# Patient Record
Sex: FEMALE | Race: BLACK OR AFRICAN AMERICAN | NOT HISPANIC OR LATINO | URBAN - METROPOLITAN AREA
[De-identification: names, ages, dates, MRNs, and addresses within clinical notes are randomized per-mention and may not be internally consistent; named-entity substitution may affect disease eponyms.]

---

## 2019-02-23 ENCOUNTER — EMERGENCY (EMERGENCY)
Facility: HOSPITAL | Age: 44
LOS: 1 days | Discharge: HOME | End: 2019-02-23
Attending: EMERGENCY MEDICINE
Payer: COMMERCIAL

## 2019-02-23 VITALS
OXYGEN SATURATION: 100 % | SYSTOLIC BLOOD PRESSURE: 135 MMHG | HEIGHT: 66 IN | TEMPERATURE: 98 F | HEART RATE: 60 BPM | RESPIRATION RATE: 18 BRPM | WEIGHT: 179.02 LBS | DIASTOLIC BLOOD PRESSURE: 95 MMHG

## 2019-02-23 PROCEDURE — 99284 EMERGENCY DEPT VISIT MOD MDM: CPT

## 2019-02-23 PROCEDURE — 99283 EMERGENCY DEPT VISIT LOW MDM: CPT

## 2019-02-23 RX ORDER — OXYCODONE HYDROCHLORIDE 5 MG/1
1 TABLET ORAL
Qty: 12 | Refills: 0 | OUTPATIENT
Start: 2019-02-23 | End: 2019-02-25

## 2019-02-23 RX ORDER — CHLORHEXIDINE GLUCONATE 213 G/1000ML
2 SOLUTION TOPICAL
Qty: 210 | Refills: 0 | OUTPATIENT
Start: 2019-02-23 | End: 2019-03-01

## 2019-02-23 RX ORDER — OXYCODONE HYDROCHLORIDE 5 MG/1
5 TABLET ORAL ONCE
Qty: 0 | Refills: 0 | Status: DISCONTINUED | OUTPATIENT
Start: 2019-02-23 | End: 2019-02-23

## 2019-02-23 RX ORDER — AMOXICILLIN 250 MG/5ML
500 SUSPENSION, RECONSTITUTED, ORAL (ML) ORAL ONCE
Qty: 0 | Refills: 0 | Status: COMPLETED | OUTPATIENT
Start: 2019-02-23 | End: 2019-02-23

## 2019-02-23 RX ORDER — AMOXICILLIN 250 MG/5ML
1 SUSPENSION, RECONSTITUTED, ORAL (ML) ORAL
Qty: 21 | Refills: 0 | OUTPATIENT
Start: 2019-02-23 | End: 2019-03-01

## 2019-02-23 RX ADMIN — OXYCODONE HYDROCHLORIDE 5 MILLIGRAM(S): 5 TABLET ORAL at 19:09

## 2019-02-23 RX ADMIN — Medication 500 MILLIGRAM(S): at 19:08

## 2019-02-23 NOTE — ED PROVIDER NOTE - CLINICAL SUMMARY MEDICAL DECISION MAKING FREE TEXT BOX
43F with dentalgia, will send home with Amoxicillin, Peridex and Oxycodone for pain control.  Will follow up with her dentist or with [facility] oral surgery clinic.

## 2019-02-23 NOTE — ED PROVIDER NOTE - OBJECTIVE STATEMENT
44 yo f no pmh pw r sided dental pain. states pain is at upper/lower r sided molars, sharp, intermittent, minimally relieved with ibuprofen, worse with pressure. pt states she has had ongoing sx for weeks. pt states she was scheduled for root canal at a month prior, however due to insurance issues she rescheduled it. pt has f/u appt this Thursday. presented today because she took ibuprofen at 15:00 with minimal relief. denies cp, sob, n/v, f/c.

## 2019-02-23 NOTE — ED PROVIDER NOTE - ATTENDING CONTRIBUTION TO CARE
Attending MD Aaron: I personally have seen and examined this patient.  Resident note reviewed and agree on plan of care and except where noted.  See below for details.     Seen in FT3, accompanied by     43F with no reported PMH/PSH/Meds/Allergies presents to the ED with R sided dental pain.  Reports pain has been going on for about 4-5 months.  Reports pain worsened about a week ago.  Reports had seen a dentist about two weeks ago and had a scan, was told she needed a coronectomy, but had insurance problems so did not return.  Now presents with increasing pain.  Denies fevers, chills.  Denies difficulty swallowing, denies difficulty breathing.  Denies tobacco, drugs, EtOH.  LMP 2/1/19.  Reports has appointment with a dentist on Thursday 2/28/19.  Reports minimal improvement with Ibuprofen.  On exam, NAD, unlabored breathing, lungs CTAB, no stridor, +S1S2, no m/r/g, no fluctuance along gum line, #32 with tooth decay, tenderness with tapping on tooth,  no fluctuance at buccal or lingual aspect of gum at that tooth, no tongue swelling, no induration of base of mouth, no fullness of neck or face, no drooling, no muffled voice; A/P: 43F with dentalgia and known need for coronectomy, declined block, will give po pain meds, will discharge with antibiotics, Amoxicillin 500mg TID x 7days, Peridex 30cc BID x 7 days, and Oxycodone for pain control.  Emphasized importance of dental follow up.  Will encourage follow up with own dentist but if unable to see her own dentist, will give [facility] clinic number [phone] (Mon-Fri) for follow up.  Stable for discharge. Follow up instructions given, importance of follow up emphasized, return to ED parameters reviewed and patient verbalized understanding.  All questions answered, all concerns addressed.

## 2019-02-23 NOTE — ED PROVIDER NOTE - PHYSICAL EXAMINATION
General: well appearing, interactive, well nourished, no apparent distress, speaking full sentences, no drooling, voice is not muffled  HEENT: EOMI, PERRLA, normal mucosa, normal oropharynx, no lesions on the lips or on oral mucosa, normal external ear, tooth 32 ttp palpation, no evidence of exudate  Neck: supple, no lymphadenopathy, full range of motion, no nuchal rigidity  CV: RRR  Resp: non-labored breathing  Abd: non-distended, soft  : no CVA tenderness  MSK: full range of motion, no cyanosis, no edema, no clubbing, no immobility  Neuro: muscle strength 5/5 in all extremities, normal gait  Skin: no rashes, skin intact General: well appearing, interactive, well nourished, no apparent distress, speaking full sentences, no drooling, voice is not muffled, no trismus  HEENT: EOMI, PERRLA, normal mucosa, normal oropharynx, no lesions on the lips or on oral mucosa, normal external ear, tooth 32 ttp palpation, no evidence of exudate  Neck: supple, no lymphadenopathy, full range of motion, no nuchal rigidity  CV: RRR  Resp: non-labored breathing  Abd: non-distended, soft  : no CVA tenderness  MSK: full range of motion, no cyanosis, no edema, no clubbing, no immobility  Neuro: muscle strength 5/5 in all extremities, normal gait  Skin: no rashes, skin intact

## 2019-02-23 NOTE — ED PROVIDER NOTE - NS ED ROS FT
GENERAL: No fever or chills, //             EYES: no change in vision, //             HEENT: dental pain, //             CARDIAC: no chest pain, //              PULMONARY: no cough or SOB, //             GI: no abdominal pain, no nausea or no vomiting, no diarrhea or constipation, //             : No changes in urination,  //            SKIN: no rashes,  //            NEURO: no headache,  //             MSK: No joint pain otherwise as HPI or negative. ~Alexy Redman DO PGY1

## 2019-02-23 NOTE — ED PROVIDER NOTE - NSFOLLOWUPINSTRUCTIONS_ED_ALL_ED_FT
1) Please follow up with your Primary Care Provider in 24-48 hours  2) Seek immediate medical care for any new or returning symptoms including but not limited to severe pain, difficulty breathing, high fevers  3) Take Tylenol 650 mg every 4-6 hours as needed for pain. Do not take more than 2 grams within a 24 hour period  4) Take Amoxicillin three times a day for 7 days  5) Use Peridex 30 mL twice a day for 7 days  6) Take Oxycodone up to every 6 hours as needed for severe pain. Do not operate heavy machinery or make critical decisions when using this medication  7) Follow up with Dental Clinic, their number is: [phone] (Mon-Fri)

## 2019-04-04 ENCOUNTER — OUTPATIENT (OUTPATIENT)
Dept: OUTPATIENT SERVICES | Facility: HOSPITAL | Age: 44
LOS: 1 days | End: 2019-04-04
Payer: COMMERCIAL

## 2019-04-04 VITALS
HEART RATE: 86 BPM | DIASTOLIC BLOOD PRESSURE: 89 MMHG | WEIGHT: 173.94 LBS | OXYGEN SATURATION: 100 % | HEIGHT: 66 IN | RESPIRATION RATE: 17 BRPM | TEMPERATURE: 98 F | SYSTOLIC BLOOD PRESSURE: 136 MMHG

## 2019-04-04 DIAGNOSIS — N84.0 POLYP OF CORPUS UTERI: ICD-10-CM

## 2019-04-04 DIAGNOSIS — Z01.818 ENCOUNTER FOR OTHER PREPROCEDURAL EXAMINATION: ICD-10-CM

## 2019-04-04 DIAGNOSIS — Z98.890 OTHER SPECIFIED POSTPROCEDURAL STATES: Chronic | ICD-10-CM

## 2019-04-04 LAB
ANION GAP SERPL CALC-SCNC: 6 MMOL/L — SIGNIFICANT CHANGE UP (ref 5–17)
APTT BLD: 40.1 SEC — HIGH (ref 27.5–36.3)
BUN SERPL-MCNC: 18 MG/DL — SIGNIFICANT CHANGE UP (ref 7–18)
CALCIUM SERPL-MCNC: 9.3 MG/DL — SIGNIFICANT CHANGE UP (ref 8.4–10.5)
CHLORIDE SERPL-SCNC: 107 MMOL/L — SIGNIFICANT CHANGE UP (ref 96–108)
CO2 SERPL-SCNC: 29 MMOL/L — SIGNIFICANT CHANGE UP (ref 22–31)
CREAT SERPL-MCNC: 1 MG/DL — SIGNIFICANT CHANGE UP (ref 0.5–1.3)
GLUCOSE SERPL-MCNC: 120 MG/DL — HIGH (ref 70–99)
HCG SERPL-ACNC: <1 MIU/ML — SIGNIFICANT CHANGE UP
HCT VFR BLD CALC: 37.7 % — SIGNIFICANT CHANGE UP (ref 34.5–45)
HGB BLD-MCNC: 11.9 G/DL — SIGNIFICANT CHANGE UP (ref 11.5–15.5)
INR BLD: 1.01 RATIO — SIGNIFICANT CHANGE UP (ref 0.88–1.16)
MCHC RBC-ENTMCNC: 29.8 PG — SIGNIFICANT CHANGE UP (ref 27–34)
MCHC RBC-ENTMCNC: 31.6 GM/DL — LOW (ref 32–36)
MCV RBC AUTO: 94.3 FL — SIGNIFICANT CHANGE UP (ref 80–100)
NRBC # BLD: 0 /100 WBCS — SIGNIFICANT CHANGE UP (ref 0–0)
PLATELET # BLD AUTO: 326 K/UL — SIGNIFICANT CHANGE UP (ref 150–400)
POTASSIUM SERPL-MCNC: 3.8 MMOL/L — SIGNIFICANT CHANGE UP (ref 3.5–5.3)
POTASSIUM SERPL-SCNC: 3.8 MMOL/L — SIGNIFICANT CHANGE UP (ref 3.5–5.3)
PROTHROM AB SERPL-ACNC: 11.2 SEC — SIGNIFICANT CHANGE UP (ref 10–12.9)
RBC # BLD: 4 M/UL — SIGNIFICANT CHANGE UP (ref 3.8–5.2)
RBC # FLD: 12.5 % — SIGNIFICANT CHANGE UP (ref 10.3–14.5)
SODIUM SERPL-SCNC: 142 MMOL/L — SIGNIFICANT CHANGE UP (ref 135–145)
WBC # BLD: 7.71 K/UL — SIGNIFICANT CHANGE UP (ref 3.8–10.5)
WBC # FLD AUTO: 7.71 K/UL — SIGNIFICANT CHANGE UP (ref 3.8–10.5)

## 2019-04-04 PROCEDURE — 85730 THROMBOPLASTIN TIME PARTIAL: CPT

## 2019-04-04 PROCEDURE — 36415 COLL VENOUS BLD VENIPUNCTURE: CPT

## 2019-04-04 PROCEDURE — 86900 BLOOD TYPING SEROLOGIC ABO: CPT

## 2019-04-04 PROCEDURE — 86850 RBC ANTIBODY SCREEN: CPT

## 2019-04-04 PROCEDURE — 85027 COMPLETE CBC AUTOMATED: CPT

## 2019-04-04 PROCEDURE — 80048 BASIC METABOLIC PNL TOTAL CA: CPT

## 2019-04-04 PROCEDURE — 85610 PROTHROMBIN TIME: CPT

## 2019-04-04 PROCEDURE — 86901 BLOOD TYPING SEROLOGIC RH(D): CPT

## 2019-04-04 PROCEDURE — 84702 CHORIONIC GONADOTROPIN TEST: CPT

## 2019-04-04 PROCEDURE — G0463: CPT

## 2019-04-04 RX ORDER — SODIUM CHLORIDE 9 MG/ML
3 INJECTION INTRAMUSCULAR; INTRAVENOUS; SUBCUTANEOUS EVERY 8 HOURS
Qty: 0 | Refills: 0 | Status: DISCONTINUED | OUTPATIENT
Start: 2019-04-12 | End: 2019-04-20

## 2019-04-04 NOTE — H&P PST ADULT - HISTORY OF PRESENT ILLNESS
43 year old female in generally good health presents with spotting in between my periods. Pt has a 28 day cycle lasting 5 days. Pt states started spotting a few months ago. Pt was seen by gyn and had a sonogram performed. A biopsy was taken but now requires more intervention. Pt is schedule for a Dilation and Curettage and hysteroscopy on 4/12/2019.

## 2019-04-04 NOTE — H&P PST ADULT - NSICDXPROBLEM_GEN_ALL_CORE_FT
R/O PROBLEM DIAGNOSES  Problem: Polyp of corpus uteri  Assessment and Plan: Schedule for dilation and curettage hysteroscopy

## 2019-04-04 NOTE — H&P PST ADULT - NSANTHOSAYNRD_GEN_A_CORE
No. TD screening performed.  STOP BANG Legend: 0-2 = LOW Risk; 3-4 = INTERMEDIATE Risk; 5-8 = HIGH Risk

## 2019-04-11 ENCOUNTER — TRANSCRIPTION ENCOUNTER (OUTPATIENT)
Age: 44
End: 2019-04-11

## 2019-04-12 ENCOUNTER — OUTPATIENT (OUTPATIENT)
Dept: OUTPATIENT SERVICES | Facility: HOSPITAL | Age: 44
LOS: 1 days | End: 2019-04-12
Payer: COMMERCIAL

## 2019-04-12 VITALS
RESPIRATION RATE: 17 BRPM | HEIGHT: 66 IN | WEIGHT: 173.94 LBS | DIASTOLIC BLOOD PRESSURE: 81 MMHG | TEMPERATURE: 98 F | HEART RATE: 106 BPM | SYSTOLIC BLOOD PRESSURE: 123 MMHG | OXYGEN SATURATION: 95 %

## 2019-04-12 VITALS
TEMPERATURE: 98 F | DIASTOLIC BLOOD PRESSURE: 67 MMHG | SYSTOLIC BLOOD PRESSURE: 123 MMHG | OXYGEN SATURATION: 100 % | HEART RATE: 76 BPM | RESPIRATION RATE: 16 BRPM

## 2019-04-12 DIAGNOSIS — Z01.818 ENCOUNTER FOR OTHER PREPROCEDURAL EXAMINATION: ICD-10-CM

## 2019-04-12 DIAGNOSIS — N84.0 POLYP OF CORPUS UTERI: ICD-10-CM

## 2019-04-12 DIAGNOSIS — Z98.890 OTHER SPECIFIED POSTPROCEDURAL STATES: Chronic | ICD-10-CM

## 2019-04-12 LAB
APTT BLD: 38 SEC — HIGH (ref 27.5–36.3)
HCG UR QL: NEGATIVE — SIGNIFICANT CHANGE UP
INR BLD: 0.97 RATIO — SIGNIFICANT CHANGE UP (ref 0.88–1.16)
PROTHROM AB SERPL-ACNC: 10.7 SEC — SIGNIFICANT CHANGE UP (ref 10–12.9)

## 2019-04-12 RX ORDER — SODIUM CHLORIDE 9 MG/ML
1000 INJECTION, SOLUTION INTRAVENOUS
Qty: 0 | Refills: 0 | Status: DISCONTINUED | OUTPATIENT
Start: 2019-04-12 | End: 2019-04-12

## 2019-04-12 RX ORDER — ONDANSETRON 8 MG/1
4 TABLET, FILM COATED ORAL EVERY 6 HOURS
Qty: 0 | Refills: 0 | Status: DISCONTINUED | OUTPATIENT
Start: 2019-04-12 | End: 2019-04-20

## 2019-04-12 RX ORDER — OXYCODONE AND ACETAMINOPHEN 5; 325 MG/1; MG/1
2 TABLET ORAL EVERY 6 HOURS
Qty: 0 | Refills: 0 | Status: DISCONTINUED | OUTPATIENT
Start: 2019-04-12 | End: 2019-04-12

## 2019-04-12 RX ORDER — IBUPROFEN 200 MG
600 TABLET ORAL EVERY 8 HOURS
Qty: 0 | Refills: 0 | Status: DISCONTINUED | OUTPATIENT
Start: 2019-04-12 | End: 2019-04-20

## 2019-04-12 NOTE — ASU DISCHARGE PLAN (ADULT/PEDIATRIC) - CALL YOUR DOCTOR IF YOU HAVE ANY OF THE FOLLOWING:
Inability to tolerate liquids or foods/Fever greater than (need to indicate Fahrenheit or Celsius)/Bleeding that does not stop/Nausea and vomiting that does not stop/Unable to urinate

## 2019-04-12 NOTE — ASU DISCHARGE PLAN (ADULT/PEDIATRIC) - ACTIVITY LEVEL
Nothing per vagina/No douching/No heavy lifting/No tampons/No tub baths/No intercourse No tampons/Nothing per rectum/No tub baths/No intercourse/No heavy lifting/No douching/Nothing per vagina

## 2019-04-12 NOTE — ASU DISCHARGE PLAN (ADULT/PEDIATRIC) - CARE PROVIDER_API CALL
Lelo Wilkins)  Obstetrics and Gynecology  [address] [place] [address]  Phone: [phone]  Fax: [phone]  Follow Up Time:

## 2019-04-15 ENCOUNTER — RESULT REVIEW (OUTPATIENT)
Age: 44
End: 2019-04-15

## 2019-04-15 PROCEDURE — 86901 BLOOD TYPING SEROLOGIC RH(D): CPT

## 2019-04-15 PROCEDURE — 81025 URINE PREGNANCY TEST: CPT

## 2019-04-15 PROCEDURE — 88305 TISSUE EXAM BY PATHOLOGIST: CPT | Mod: 26

## 2019-04-15 PROCEDURE — 36415 COLL VENOUS BLD VENIPUNCTURE: CPT

## 2019-04-15 PROCEDURE — 86900 BLOOD TYPING SEROLOGIC ABO: CPT

## 2019-04-15 PROCEDURE — 85730 THROMBOPLASTIN TIME PARTIAL: CPT

## 2019-04-15 PROCEDURE — 88305 TISSUE EXAM BY PATHOLOGIST: CPT

## 2019-04-15 PROCEDURE — 58558 HYSTEROSCOPY BIOPSY: CPT

## 2019-04-15 PROCEDURE — 86850 RBC ANTIBODY SCREEN: CPT

## 2019-04-15 PROCEDURE — 85610 PROTHROMBIN TIME: CPT

## 2019-04-16 LAB — SURGICAL PATHOLOGY STUDY: SIGNIFICANT CHANGE UP

## 2019-10-09 NOTE — H&P PST ADULT - RESPIRATORY AND THORAX
Daily Note     Today's date: 10/9/2019  Patient name: Mariam Aviles  : 1979  MRN: [id number]  Referring provider: Jose Jones MD  Dx:   Encounter Diagnosis     ICD-10-CM    1  Acute pain of left shoulder M25 512    2  Acromioclavicular joint injury, left, subsequent encounter S49  92XD                   Subjective: "I am still getting that clicking/popping in my shoulder during certain movements " "Sometimes it is painful, other times it is not "      Objective: See treatment diary below      Assessment: Tolerated treatment well  No progressions secondary to multiple progressions last visit  PROM of the L shoulder WNL  Patient demonstrated fatigue post treatment and would benefit from continued PT      Plan: Continue per plan of care        Precautions: none  Re-evaluation due 10/18  Manual  10/2 10/4 10/7 10/9 9/30   SC jt mobs AP and inferior        L shoulder PROM to jayda x x x x x                               Exercise Diary  10/2 10/4 10/7 10/7 9/30   HEP instruction  Updated      Pulleys 10" x10 Flex/Scap 10" x10 Flex/Scap 10" x10 Flex/Scap 10" x10 Flex/Scap 10" x10 Flex/Scap   Finger ladder 10" x10 Flex/Scap 10" x10 Flex/Scap 10" x10 Flex/Scap 10"x10  Flex/scap 10" x10 Flex/Scap   UT stretch DC DC to HEP --  L side only 30" x3   Levator stretch DC DC to HEP --  L side only 30" x3   Shoulder isometrics - all dir 5" x10 Flex/ER/IR Red TB IR/ER x20 ea Red TB IR/ER x20 ea Red TB IR/ER x20 ea 5" x10 Flex/ER/IR   Standing AAROM shoulder scaption w/cane        Standing scaption w/therapist  assist scap upward rotation        Pec stretch 30" x3  ea DC to HEP -- -- 30" x3  ea   Rhomboid stretch 30" x3  ea DC to HEP -- -- 30" x3  ea   UBE  100 RPM 8' retro 100 RPM 10' retro 100 RPM 10' retro  100 RPM 10' retro  100 RPM 8' retro    MTP/LTP Green TB 5" 2x10 Green TB 5" 2x10 Blue TB 5" 2x10 Blue TB 5" 2x10 Green TB 5" 2x10   AROM Flex to 90* 3" 2x10 3" 2x10 3" 2x10 1# 3" 2x10 1#    AROM Scap to 90* 3" 2x10 3" 2x10 3" 2x10 1# 3" 2x10 1#    Prone Flex 2x10 2x10 2x10 1# 2x10 1#    Prone HRZ ABD 2x10 2x10 2x10 1# 2x10 1#    Prone Ext 2x10 2x10 2x10 1# 2x10 1#                                Modalities  10/2 10/4 10/7 10/9 9/30   Stim/CP post tx to L shoulder NP NP --  NP details…

## 2020-05-30 NOTE — ED PROVIDER NOTE - TOBACCO USE
Patient:   EFREN KHANNA            MRN: [id number]            FIN: [id number]              Age:   31 years     Sex:  FEMALE     :  [birth year]   Associated Diagnoses:   None   Author:   FILIBERTO BOYD     Postoperative Information     Postoperative Information   Post Operative Note:  Post Anesthesia Care Unit.    Postoperative Information:          Preoperative Diagnosis:     Bartholin's gland cyst.         Postoperative Diagnosis: Same As Pre-Op Dx,     .         Performed by:     Asem, MD Humera.         Assistant:     Filiberto Lew MD .         Surgical Tasks Performed by Assistant: Dissecting tissue, Altering tissue.         Procedures Performed:     incision and drainage of Bartholin gland cyst.         Findings: 3x3cm mobile, well-demarcated Bartholin gland cyst, mildly tender with no induration or erythema; mucinous fluid upon incision.         Specimens Removed: None.         Estimated Blood Loss: 1  ml, Blood Administered: None,     .         Blood Administered: No.         Complications: None.         Grafts/Implants: None.    Anesthetic utilized:  local .  .    Consents:  Patient was counseled on risks of incision and drainage which included infection and bleeding. Patient agreed to procedure and signed a consent for Incision and Drainage.  Procedure:   Area was identified and wiped clean with alcohol swab. 5mL of 1% lidocaine was injected superficially into the skin overlying the Bartholin's gland. 3mm was made with scalpel and mucinous fluid was drained from the abscess. Iodiform packing was placed into the emptied gland and trimmed with 1cm tail. Hemostasis was noted and the patient tolerated the exam well.  Dr. Humera Asem was present for the entirety of the procedure.   Filiberto Lew MD, PGY2  Obstetrics and Gynecology  I was scrubbed and present throughout the procedure and agree with the note as documented above. Humera Asem, MD  
Never smoker

## 2022-01-04 NOTE — ASU PREOP CHECKLIST - ISOLATION PRECAUTIONS
Detail Level: Detailed Add 47730 Cpt? (Important Note: In 2017 The Use Of 31776 Is Being Tracked By Cms To Determine Future Global Period Reimbursement For Global Periods): no none

## 2022-05-24 ENCOUNTER — APPOINTMENT (OUTPATIENT)
Dept: ORTHOPEDIC SURGERY | Facility: CLINIC | Age: 47
End: 2022-05-24
Payer: COMMERCIAL

## 2022-05-24 VITALS — HEIGHT: 66 IN | BODY MASS INDEX: 26.03 KG/M2 | WEIGHT: 162 LBS

## 2022-05-24 DIAGNOSIS — E11.9 TYPE 2 DIABETES MELLITUS W/OUT COMPLICATIONS: ICD-10-CM

## 2022-05-24 PROBLEM — Z00.00 ENCOUNTER FOR PREVENTIVE HEALTH EXAMINATION: Status: ACTIVE | Noted: 2022-05-24

## 2022-05-24 PROCEDURE — 99203 OFFICE O/P NEW LOW 30 MIN: CPT | Mod: 25

## 2022-05-24 PROCEDURE — 72100 X-RAY EXAM L-S SPINE 2/3 VWS: CPT

## 2022-05-24 RX ORDER — IBUPROFEN 800 MG/1
800 TABLET, FILM COATED ORAL 3 TIMES DAILY
Qty: 90 | Refills: 3 | Status: ACTIVE | COMMUNITY
Start: 2022-05-24 | End: 1900-01-01

## 2022-05-24 RX ORDER — METFORMIN HYDROCHLORIDE 500 MG/1
500 TABLET, COATED ORAL
Refills: 0 | Status: ACTIVE | COMMUNITY

## 2022-05-24 NOTE — PHYSICAL EXAM
[Straightening consistent with spasm] : Straightening consistent with spasm [There are no fractures, subluxations or dislocations. No significant abnormalities are seen] : There are no fractures, subluxations or dislocations. No significant abnormalities are seen [] : no erythema

## 2022-05-24 NOTE — HISTORY OF PRESENT ILLNESS
[10] : 10 [Dull/Aching] : dull/aching [Sharp] : sharp [Constant] : constant [de-identified] : 5/24/22: Patient is a 47 yo female c/o right leg pain for 3 weeks with no specific injury. SHe is having low back pain. No n/t. Taking tylenol as needed for pain. Pain is worse with sitting and laying down. No previous injuries or surgeries to low back or right hip. [] : no [FreeTextEntry5] : 05/06/2022 pt hurt the right leg trying to stop someone from falling [FreeTextEntry7] : down the right leg [de-identified] : 05/14/2022 [de-identified] : city[place]

## 2022-05-24 NOTE — PROCEDURE
[Therapeutic Injection] : therapeutic injection [Right] : of the right [Other: ____] : [unfilled] [___ cc    1%] : Lidocaine ~Vcc of 1%  [___ cc    30mg] : Ketorolac (Toradol) ~Vcc of 30 mg

## 2022-05-24 NOTE — ASSESSMENT
[FreeTextEntry1] : Xrays reviewed with patient\par Treatment options discussed\par TOradol injection done for pain relief, tolerated well\par ibuprofen 800 sent for pain and inflammation, start tomorrow\par flexeril sent for spasm\par Recommend course of PT/HEP\par Follow up in 4 weeks with spine specialist

## 2022-05-25 PROBLEM — N84.0 POLYP OF CORPUS UTERI: Chronic | Status: ACTIVE | Noted: 2019-04-04

## 2022-05-31 ENCOUNTER — APPOINTMENT (OUTPATIENT)
Dept: ORTHOPEDIC SURGERY | Facility: CLINIC | Age: 47
End: 2022-05-31

## 2022-05-31 ENCOUNTER — APPOINTMENT (OUTPATIENT)
Dept: ORTHOPEDIC SURGERY | Facility: CLINIC | Age: 47
End: 2022-05-31
Payer: COMMERCIAL

## 2022-05-31 VITALS — WEIGHT: 162 LBS | BODY MASS INDEX: 26.03 KG/M2 | HEIGHT: 66 IN

## 2022-05-31 DIAGNOSIS — M76.61 ACHILLES TENDINITIS, RIGHT LEG: ICD-10-CM

## 2022-05-31 PROCEDURE — 99213 OFFICE O/P EST LOW 20 MIN: CPT

## 2022-05-31 NOTE — PHYSICAL EXAM
[Straightening consistent with spasm] : Straightening consistent with spasm [There are no fractures, subluxations or dislocations. No significant abnormalities are seen] : There are no fractures, subluxations or dislocations. No significant abnormalities are seen [Right] : right foot and ankle [] : achilles tendon insertion tenderness

## 2022-05-31 NOTE — HISTORY OF PRESENT ILLNESS
[de-identified] : 5/31/22: Here to f/u. States her pain continues. Has not had PT yet. Also having heel pain.\par \par 5/24/22: Patient is a 45 yo female c/o right leg pain for 3 weeks with no specific injury. SHe is having low back pain. No n/t. Taking tylenol as needed for pain. Pain is worse with sitting and laying down. No previous injuries or surgeries to low back or right hip. 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

## 2022-05-31 NOTE — ASSESSMENT
[FreeTextEntry1] : Treatment options discussed with patient and  in detail\par continue ibuprofen 800and flexeril\par Recommend again starting course of PT/HEP\par OOW for 1 week renewed, she still has not made specialist appointment. Discussed she must see specialist to be evaluated, OOW will not be renewed after today in UC.\par Follow up in 1 weeks with spine specialist \par Ankle specialist follow up if achilles pain continues

## 2022-05-31 NOTE — HISTORY OF PRESENT ILLNESS
[de-identified] : 5/31/22: Here to f/u. States her pain continues. Has not had PT yet. Also having heel pain.\par \par 5/24/22: Patient is a 47 yo female c/o right leg pain for 3 weeks with no specific injury. SHe is having low back pain. No n/t. Taking tylenol as needed for pain. Pain is worse with sitting and laying down. No previous injuries or surgeries to low back or right hip. 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

## 2022-06-06 ENCOUNTER — APPOINTMENT (OUTPATIENT)
Dept: ORTHOPEDIC SURGERY | Facility: CLINIC | Age: 47
End: 2022-06-06
Payer: COMMERCIAL

## 2022-06-06 PROCEDURE — 99214 OFFICE O/P EST MOD 30 MIN: CPT

## 2022-06-06 NOTE — ASSESSMENT
[FreeTextEntry1] : Pt. to see Dr. Tapia tomorrow.\par No foot/ankle pathology at this time.\par Her symptoms are stemming from her lumbar spine.

## 2022-06-06 NOTE — HISTORY OF PRESENT ILLNESS
[Work related] : work related [Sudden] : sudden [6] : 6 [5] : 5 [Dull/Aching] : dull/aching [Household chores] : household chores [Leisure] : leisure [Social interactions] : social interactions [Rest] : rest [Meds] : meds [Ice] : ice [Sitting] : sitting [Standing] : standing [Walking] : walking [Stairs] : stairs [Not working due to injury] : Work status: not working due to injury [de-identified] : Pt is a 46 year old female who presents for evaluation of her RT ankle. No trauma reported. Pt. was seen 5/24 and 5/31/2022 by [facility]: WILVER Gordon initially with c/o right leg pain since early May 2022. She is scheduled to see spine specialist, Dr. Tapia tomorrow. She experiences LBP and pain that radiates into her RT lower extremity. There is numbness/tingling in the RT leg. NSAIDS and muscle relaxer prn. Ambulating with a cane. No previous injury/problem with RT ankle.  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

## 2022-06-06 NOTE — RETURN TO WORK/SCHOOL
[FreeTextEntry1] : Pt. is unable to work at this time due the nature of their injury. This will be reevaluated by Dr. Tapia on 6/7/22. \par

## 2022-06-06 NOTE — PHYSICAL EXAM
[Right] : right foot and ankle [NL (40)] : plantar flexion 40 degrees [NL 30)] : inversion 30 degrees [NL (20)] : eversion 20 degrees [2+] : posterior tibialis pulse: 2+ [Normal] : saphenous nerve sensation normal [5___] : right extensor hallicus longus 5[unfilled]/5 [4___] : inversion 4[unfilled]/5 [] : no pain when stressing lateral tarsal metatarsal joint

## 2022-06-07 ENCOUNTER — APPOINTMENT (OUTPATIENT)
Dept: ORTHOPEDIC SURGERY | Facility: CLINIC | Age: 47
End: 2022-06-07
Payer: COMMERCIAL

## 2022-06-07 PROCEDURE — 99072 ADDL SUPL MATRL&STAF TM PHE: CPT

## 2022-06-07 PROCEDURE — 99204 OFFICE O/P NEW MOD 45 MIN: CPT

## 2022-06-07 NOTE — RETURN TO WORK/SCHOOL
[Return Date: _____] : as of [unfilled].  This has been discussed in detail with ~Yen~ and ~he/she~ understands this. [Work] : work

## 2022-06-14 ENCOUNTER — APPOINTMENT (OUTPATIENT)
Dept: MRI IMAGING | Facility: CLINIC | Age: 47
End: 2022-06-14

## 2022-06-14 ENCOUNTER — FORM ENCOUNTER (OUTPATIENT)
Age: 47
End: 2022-06-14

## 2022-06-15 ENCOUNTER — APPOINTMENT (OUTPATIENT)
Dept: MRI IMAGING | Facility: CLINIC | Age: 47
End: 2022-06-15
Payer: COMMERCIAL

## 2022-06-15 PROCEDURE — 72148 MRI LUMBAR SPINE W/O DYE: CPT

## 2022-06-15 PROCEDURE — 99072 ADDL SUPL MATRL&STAF TM PHE: CPT

## 2022-06-17 ENCOUNTER — APPOINTMENT (OUTPATIENT)
Dept: ORTHOPEDIC SURGERY | Facility: CLINIC | Age: 47
End: 2022-06-17
Payer: COMMERCIAL

## 2022-06-17 VITALS — WEIGHT: 162 LBS | BODY MASS INDEX: 37.49 KG/M2 | HEIGHT: 55 IN

## 2022-06-17 PROCEDURE — 99072 ADDL SUPL MATRL&STAF TM PHE: CPT

## 2022-06-17 PROCEDURE — 99215 OFFICE O/P EST HI 40 MIN: CPT

## 2022-07-06 ENCOUNTER — TRANSCRIPTION ENCOUNTER (OUTPATIENT)
Age: 47
End: 2022-07-06

## 2022-07-07 ENCOUNTER — TRANSCRIPTION ENCOUNTER (OUTPATIENT)
Age: 47
End: 2022-07-07

## 2022-07-07 ENCOUNTER — INPATIENT (INPATIENT)
Facility: HOSPITAL | Age: 47
LOS: 4 days | Discharge: HOME | End: 2022-07-12
Attending: ORTHOPAEDIC SURGERY | Admitting: ORTHOPAEDIC SURGERY
Payer: COMMERCIAL

## 2022-07-07 ENCOUNTER — APPOINTMENT (OUTPATIENT)
Dept: ORTHOPEDIC SURGERY | Facility: CLINIC | Age: 47
End: 2022-07-07

## 2022-07-07 VITALS — WEIGHT: 162 LBS | HEIGHT: 66 IN | BODY MASS INDEX: 26.03 KG/M2

## 2022-07-07 VITALS
HEIGHT: 66 IN | TEMPERATURE: 98 F | RESPIRATION RATE: 16 BRPM | SYSTOLIC BLOOD PRESSURE: 131 MMHG | OXYGEN SATURATION: 98 % | HEART RATE: 88 BPM | WEIGHT: 162.04 LBS | DIASTOLIC BLOOD PRESSURE: 87 MMHG

## 2022-07-07 DIAGNOSIS — Z98.890 OTHER SPECIFIED POSTPROCEDURAL STATES: Chronic | ICD-10-CM

## 2022-07-07 LAB
BASOPHILS # BLD AUTO: 0.02 K/UL — SIGNIFICANT CHANGE UP (ref 0–0.2)
BASOPHILS NFR BLD AUTO: 0.2 % — SIGNIFICANT CHANGE UP (ref 0–2)
EOSINOPHIL # BLD AUTO: 0.03 K/UL — SIGNIFICANT CHANGE UP (ref 0–0.5)
EOSINOPHIL NFR BLD AUTO: 0.4 % — SIGNIFICANT CHANGE UP (ref 0–6)
HCT VFR BLD CALC: 37.5 % — SIGNIFICANT CHANGE UP (ref 34.5–45)
HGB BLD-MCNC: 12.4 G/DL — SIGNIFICANT CHANGE UP (ref 11.5–15.5)
IMM GRANULOCYTES NFR BLD AUTO: 0.2 % — SIGNIFICANT CHANGE UP (ref 0–1.5)
LYMPHOCYTES # BLD AUTO: 3.17 K/UL — SIGNIFICANT CHANGE UP (ref 1–3.3)
LYMPHOCYTES # BLD AUTO: 39.2 % — SIGNIFICANT CHANGE UP (ref 13–44)
MCHC RBC-ENTMCNC: 30 PG — SIGNIFICANT CHANGE UP (ref 27–34)
MCHC RBC-ENTMCNC: 33.1 G/DL — SIGNIFICANT CHANGE UP (ref 32–36)
MCV RBC AUTO: 90.8 FL — SIGNIFICANT CHANGE UP (ref 80–100)
MONOCYTES # BLD AUTO: 0.58 K/UL — SIGNIFICANT CHANGE UP (ref 0–0.9)
MONOCYTES NFR BLD AUTO: 7.2 % — SIGNIFICANT CHANGE UP (ref 2–14)
NEUTROPHILS # BLD AUTO: 4.26 K/UL — SIGNIFICANT CHANGE UP (ref 1.8–7.4)
NEUTROPHILS NFR BLD AUTO: 52.8 % — SIGNIFICANT CHANGE UP (ref 43–77)
NRBC # BLD: 0 /100 WBCS — SIGNIFICANT CHANGE UP (ref 0–0)
PLATELET # BLD AUTO: 293 K/UL — SIGNIFICANT CHANGE UP (ref 150–400)
RBC # BLD: 4.13 M/UL — SIGNIFICANT CHANGE UP (ref 3.8–5.2)
RBC # FLD: 13.2 % — SIGNIFICANT CHANGE UP (ref 10.3–14.5)
WBC # BLD: 8.08 K/UL — SIGNIFICANT CHANGE UP (ref 3.8–10.5)
WBC # FLD AUTO: 8.08 K/UL — SIGNIFICANT CHANGE UP (ref 3.8–10.5)

## 2022-07-07 PROCEDURE — 63048 LAM FACETEC &FORAMOT EA ADDL: CPT

## 2022-07-07 PROCEDURE — 99284 EMERGENCY DEPT VISIT MOD MDM: CPT

## 2022-07-07 PROCEDURE — 99215 OFFICE O/P EST HI 40 MIN: CPT | Mod: 57

## 2022-07-07 PROCEDURE — 63047 LAM FACETEC & FORAMOT LUMBAR: CPT

## 2022-07-07 PROCEDURE — 99072 ADDL SUPL MATRL&STAF TM PHE: CPT

## 2022-07-07 RX ORDER — SODIUM CHLORIDE 9 MG/ML
1000 INJECTION, SOLUTION INTRAVENOUS
Refills: 0 | Status: DISCONTINUED | OUTPATIENT
Start: 2022-07-07 | End: 2022-07-08

## 2022-07-07 RX ORDER — MAGNESIUM HYDROXIDE 400 MG/1
30 TABLET, CHEWABLE ORAL DAILY
Refills: 0 | Status: DISCONTINUED | OUTPATIENT
Start: 2022-07-07 | End: 2022-07-08

## 2022-07-07 RX ORDER — SENNA PLUS 8.6 MG/1
2 TABLET ORAL AT BEDTIME
Refills: 0 | Status: DISCONTINUED | OUTPATIENT
Start: 2022-07-07 | End: 2022-07-08

## 2022-07-07 RX ORDER — ONDANSETRON 8 MG/1
4 TABLET, FILM COATED ORAL EVERY 6 HOURS
Refills: 0 | Status: DISCONTINUED | OUTPATIENT
Start: 2022-07-07 | End: 2022-07-08

## 2022-07-07 RX ORDER — OXYCODONE HYDROCHLORIDE 5 MG/1
10 TABLET ORAL EVERY 4 HOURS
Refills: 0 | Status: DISCONTINUED | OUTPATIENT
Start: 2022-07-07 | End: 2022-07-08

## 2022-07-07 RX ORDER — ACETAMINOPHEN 500 MG
975 TABLET ORAL EVERY 8 HOURS
Refills: 0 | Status: DISCONTINUED | OUTPATIENT
Start: 2022-07-07 | End: 2022-07-08

## 2022-07-07 RX ORDER — ASCORBIC ACID 60 MG
500 TABLET,CHEWABLE ORAL
Refills: 0 | Status: DISCONTINUED | OUTPATIENT
Start: 2022-07-07 | End: 2022-07-08

## 2022-07-07 RX ORDER — POLYETHYLENE GLYCOL 3350 17 G/17G
17 POWDER, FOR SOLUTION ORAL AT BEDTIME
Refills: 0 | Status: DISCONTINUED | OUTPATIENT
Start: 2022-07-07 | End: 2022-07-08

## 2022-07-07 RX ORDER — OXYCODONE HYDROCHLORIDE 5 MG/1
5 TABLET ORAL EVERY 4 HOURS
Refills: 0 | Status: DISCONTINUED | OUTPATIENT
Start: 2022-07-07 | End: 2022-07-08

## 2022-07-07 NOTE — ED ADULT TRIAGE NOTE - AS HEIGHT TYPE
PHYSICIAN ORDERS      DATE & TIME ISSUED: 2018 2:02 PM  PATIENT NAME: Rashad Ortiz   : 1975     [facility] MR# [if applicable]: [id number]     DIAGNOSIS:  ***  ICD-10 CODE: ***     ***    Any questions please call: ***    { [facility] FAXES:[id number]}.    {[facility] TRANSPLANT MD SIGNATURE:[id number]}       stated

## 2022-07-07 NOTE — ED ADULT TRIAGE NOTE - HEIGHT IN FEET
Breathing spontaneous and unlabored. Breath sounds clear and equal bilaterally with regular rhythm. 5

## 2022-07-07 NOTE — ED PROVIDER NOTE - IV ALTEPLASE EXCL ABS HIDDEN
Suprapubic Cystostomy   WHAT YOU NEED TO KNOW:   What do I need to know about suprapubic cystostomy? Suprapubic cystostomy is surgery to create a stoma (opening) through your abdomen into your bladder  This opening is where a catheter is inserted to drain urine  You may need a cystostomy if your urine flow is blocked  How do I prepare for surgery? Your healthcare provider will talk to you about how to prepare for surgery  He or she may tell you not to eat or drink anything after midnight on the day of your surgery  He or she will tell you what medicines to take or not take on the day of your surgery  What will happen during surgery? Your surgeon will make a small incision in your abdomen below your belly button  Another small incision will be made in your bladder  The catheter will be inserted into your abdomen and bladder  Once the catheter is in place, the balloon on the end of the catheter will be filled with sterile water  This balloon keeps the catheter in place inside the bladder  The other end of the catheter will be connected to a clean drainage bag or closed with a valve  The catheter may be secured in the stoma with stitches or surgical tape  What are the risks of surgery? You may bleed more than expected or get an infection  Your organs or blood vessels may be damaged during surgery  Your bladder may become irritated  Long-term use of a catheter can lead to kidney stones, blood in your urine, or bladder inflammation  CARE AGREEMENT:   You have the right to help plan your care  Learn about your health condition and how it may be treated  Discuss treatment options with your healthcare providers to decide what care you want to receive  You always have the right to refuse treatment  The above information is an  only  It is not intended as medical advice for individual conditions or treatments   Talk to your doctor, nurse or pharmacist before following any medical regimen to see if it is safe and effective for you  © Copyright [address] Information is for End User's use only and may not be sold, redistributed or otherwise used for commercial purposes   All illustrations and images included in CareNotes® are the copyrighted property of A D A M , Inc  or [address] [place], [place] [address]abhishek rashmi  show

## 2022-07-07 NOTE — ED PROVIDER NOTE - OBJECTIVE STATEMENT
46 year old female came to the ED because of right lower back pain since an injury month ago, last week she had episode of urinary incontinence. She had outpatient MRI. No fever, no vomiting, no diarrhea, no chest pain, no sob.

## 2022-07-08 ENCOUNTER — TRANSCRIPTION ENCOUNTER (OUTPATIENT)
Age: 47
End: 2022-07-08

## 2022-07-08 ENCOUNTER — RESULT REVIEW (OUTPATIENT)
Age: 47
End: 2022-07-08

## 2022-07-08 LAB
A1C WITH ESTIMATED AVERAGE GLUCOSE RESULT: 12.1 % — HIGH (ref 4–5.6)
ALBUMIN SERPL ELPH-MCNC: 3.3 G/DL — SIGNIFICANT CHANGE UP (ref 3.3–5)
ALP SERPL-CCNC: 73 U/L — SIGNIFICANT CHANGE UP (ref 40–120)
ALT FLD-CCNC: 26 U/L — SIGNIFICANT CHANGE UP (ref 12–78)
ANION GAP SERPL CALC-SCNC: 6 MMOL/L — SIGNIFICANT CHANGE UP (ref 5–17)
ANION GAP SERPL CALC-SCNC: 7 MMOL/L — SIGNIFICANT CHANGE UP (ref 5–17)
ANION GAP SERPL CALC-SCNC: 9 MMOL/L — SIGNIFICANT CHANGE UP (ref 5–17)
APTT BLD: 34.7 SEC — SIGNIFICANT CHANGE UP (ref 27.5–35.5)
APTT BLD: 37.2 SEC — HIGH (ref 27.5–35.5)
AST SERPL-CCNC: 16 U/L — SIGNIFICANT CHANGE UP (ref 15–37)
BASOPHILS # BLD AUTO: 0.02 K/UL — SIGNIFICANT CHANGE UP (ref 0–0.2)
BASOPHILS NFR BLD AUTO: 0.2 % — SIGNIFICANT CHANGE UP (ref 0–2)
BILIRUB SERPL-MCNC: 0.2 MG/DL — SIGNIFICANT CHANGE UP (ref 0.2–1.2)
BLD GP AB SCN SERPL QL: SIGNIFICANT CHANGE UP
BUN SERPL-MCNC: 12 MG/DL — SIGNIFICANT CHANGE UP (ref 7–23)
BUN SERPL-MCNC: 12 MG/DL — SIGNIFICANT CHANGE UP (ref 7–23)
BUN SERPL-MCNC: 14 MG/DL — SIGNIFICANT CHANGE UP (ref 7–23)
CALCIUM SERPL-MCNC: 8.9 MG/DL — SIGNIFICANT CHANGE UP (ref 8.5–10.1)
CALCIUM SERPL-MCNC: 9 MG/DL — SIGNIFICANT CHANGE UP (ref 8.5–10.1)
CALCIUM SERPL-MCNC: 9.2 MG/DL — SIGNIFICANT CHANGE UP (ref 8.5–10.1)
CHLORIDE SERPL-SCNC: 104 MMOL/L — SIGNIFICANT CHANGE UP (ref 96–108)
CHLORIDE SERPL-SCNC: 105 MMOL/L — SIGNIFICANT CHANGE UP (ref 96–108)
CHLORIDE SERPL-SCNC: 106 MMOL/L — SIGNIFICANT CHANGE UP (ref 96–108)
CO2 SERPL-SCNC: 24 MMOL/L — SIGNIFICANT CHANGE UP (ref 22–31)
CO2 SERPL-SCNC: 25 MMOL/L — SIGNIFICANT CHANGE UP (ref 22–31)
CO2 SERPL-SCNC: 30 MMOL/L — SIGNIFICANT CHANGE UP (ref 22–31)
CREAT SERPL-MCNC: 0.75 MG/DL — SIGNIFICANT CHANGE UP (ref 0.5–1.3)
CREAT SERPL-MCNC: 0.76 MG/DL — SIGNIFICANT CHANGE UP (ref 0.5–1.3)
CREAT SERPL-MCNC: 0.76 MG/DL — SIGNIFICANT CHANGE UP (ref 0.5–1.3)
EGFR: 100 ML/MIN/1.73M2 — SIGNIFICANT CHANGE UP
EGFR: 98 ML/MIN/1.73M2 — SIGNIFICANT CHANGE UP
EGFR: 98 ML/MIN/1.73M2 — SIGNIFICANT CHANGE UP
EOSINOPHIL # BLD AUTO: 0 K/UL — SIGNIFICANT CHANGE UP (ref 0–0.5)
EOSINOPHIL NFR BLD AUTO: 0 % — SIGNIFICANT CHANGE UP (ref 0–6)
ESTIMATED AVERAGE GLUCOSE: 301 MG/DL — HIGH (ref 68–114)
FLUAV AG NPH QL: SIGNIFICANT CHANGE UP
FLUBV AG NPH QL: SIGNIFICANT CHANGE UP
GLUCOSE BLDC GLUCOMTR-MCNC: 210 MG/DL — HIGH (ref 70–99)
GLUCOSE BLDC GLUCOMTR-MCNC: 228 MG/DL — HIGH (ref 70–99)
GLUCOSE BLDC GLUCOMTR-MCNC: 240 MG/DL — HIGH (ref 70–99)
GLUCOSE BLDC GLUCOMTR-MCNC: 257 MG/DL — HIGH (ref 70–99)
GLUCOSE BLDC GLUCOMTR-MCNC: 323 MG/DL — HIGH (ref 70–99)
GLUCOSE BLDC GLUCOMTR-MCNC: 353 MG/DL — HIGH (ref 70–99)
GLUCOSE SERPL-MCNC: 217 MG/DL — HIGH (ref 70–99)
GLUCOSE SERPL-MCNC: 254 MG/DL — HIGH (ref 70–99)
GLUCOSE SERPL-MCNC: 266 MG/DL — HIGH (ref 70–99)
HCG SERPL-ACNC: <1 MIU/ML — SIGNIFICANT CHANGE UP
HCG UR QL: NEGATIVE — SIGNIFICANT CHANGE UP
HCT VFR BLD CALC: 34.9 % — SIGNIFICANT CHANGE UP (ref 34.5–45)
HCT VFR BLD CALC: 39.5 % — SIGNIFICANT CHANGE UP (ref 34.5–45)
HGB BLD-MCNC: 11.6 G/DL — SIGNIFICANT CHANGE UP (ref 11.5–15.5)
HGB BLD-MCNC: 13.1 G/DL — SIGNIFICANT CHANGE UP (ref 11.5–15.5)
IMM GRANULOCYTES NFR BLD AUTO: 0.3 % — SIGNIFICANT CHANGE UP (ref 0–1.5)
INR BLD: 0.98 RATIO — SIGNIFICANT CHANGE UP (ref 0.88–1.16)
INR BLD: 1.01 RATIO — SIGNIFICANT CHANGE UP (ref 0.88–1.16)
LYMPHOCYTES # BLD AUTO: 1.07 K/UL — SIGNIFICANT CHANGE UP (ref 1–3.3)
LYMPHOCYTES # BLD AUTO: 9.4 % — LOW (ref 13–44)
MCHC RBC-ENTMCNC: 29.9 PG — SIGNIFICANT CHANGE UP (ref 27–34)
MCHC RBC-ENTMCNC: 30.1 PG — SIGNIFICANT CHANGE UP (ref 27–34)
MCHC RBC-ENTMCNC: 33.2 G/DL — SIGNIFICANT CHANGE UP (ref 32–36)
MCHC RBC-ENTMCNC: 33.2 G/DL — SIGNIFICANT CHANGE UP (ref 32–36)
MCV RBC AUTO: 89.9 FL — SIGNIFICANT CHANGE UP (ref 80–100)
MCV RBC AUTO: 90.8 FL — SIGNIFICANT CHANGE UP (ref 80–100)
MONOCYTES # BLD AUTO: 0.44 K/UL — SIGNIFICANT CHANGE UP (ref 0–0.9)
MONOCYTES NFR BLD AUTO: 3.8 % — SIGNIFICANT CHANGE UP (ref 2–14)
NEUTROPHILS # BLD AUTO: 9.86 K/UL — HIGH (ref 1.8–7.4)
NEUTROPHILS NFR BLD AUTO: 86.3 % — HIGH (ref 43–77)
NRBC # BLD: 0 /100 WBCS — SIGNIFICANT CHANGE UP (ref 0–0)
NRBC # BLD: 0 /100 WBCS — SIGNIFICANT CHANGE UP (ref 0–0)
PLATELET # BLD AUTO: 236 K/UL — SIGNIFICANT CHANGE UP (ref 150–400)
PLATELET # BLD AUTO: 265 K/UL — SIGNIFICANT CHANGE UP (ref 150–400)
POTASSIUM SERPL-MCNC: 3.5 MMOL/L — SIGNIFICANT CHANGE UP (ref 3.5–5.3)
POTASSIUM SERPL-MCNC: 3.7 MMOL/L — SIGNIFICANT CHANGE UP (ref 3.5–5.3)
POTASSIUM SERPL-MCNC: 3.9 MMOL/L — SIGNIFICANT CHANGE UP (ref 3.5–5.3)
POTASSIUM SERPL-SCNC: 3.5 MMOL/L — SIGNIFICANT CHANGE UP (ref 3.5–5.3)
POTASSIUM SERPL-SCNC: 3.7 MMOL/L — SIGNIFICANT CHANGE UP (ref 3.5–5.3)
POTASSIUM SERPL-SCNC: 3.9 MMOL/L — SIGNIFICANT CHANGE UP (ref 3.5–5.3)
PROT SERPL-MCNC: 7.6 GM/DL — SIGNIFICANT CHANGE UP (ref 6–8.3)
PROTHROM AB SERPL-ACNC: 11.6 SEC — SIGNIFICANT CHANGE UP (ref 10.5–13.4)
PROTHROM AB SERPL-ACNC: 12.1 SEC — SIGNIFICANT CHANGE UP (ref 10.5–13.4)
RBC # BLD: 3.88 M/UL — SIGNIFICANT CHANGE UP (ref 3.8–5.2)
RBC # BLD: 4.35 M/UL — SIGNIFICANT CHANGE UP (ref 3.8–5.2)
RBC # FLD: 13 % — SIGNIFICANT CHANGE UP (ref 10.3–14.5)
RBC # FLD: 13.1 % — SIGNIFICANT CHANGE UP (ref 10.3–14.5)
SARS-COV-2 RNA SPEC QL NAA+PROBE: SIGNIFICANT CHANGE UP
SODIUM SERPL-SCNC: 138 MMOL/L — SIGNIFICANT CHANGE UP (ref 135–145)
SODIUM SERPL-SCNC: 138 MMOL/L — SIGNIFICANT CHANGE UP (ref 135–145)
SODIUM SERPL-SCNC: 140 MMOL/L — SIGNIFICANT CHANGE UP (ref 135–145)
WBC # BLD: 11.43 K/UL — HIGH (ref 3.8–10.5)
WBC # BLD: 7.38 K/UL — SIGNIFICANT CHANGE UP (ref 3.8–10.5)
WBC # FLD AUTO: 11.43 K/UL — HIGH (ref 3.8–10.5)
WBC # FLD AUTO: 7.38 K/UL — SIGNIFICANT CHANGE UP (ref 3.8–10.5)

## 2022-07-08 PROCEDURE — 88304 TISSUE EXAM BY PATHOLOGIST: CPT | Mod: 26

## 2022-07-08 PROCEDURE — 71045 X-RAY EXAM CHEST 1 VIEW: CPT | Mod: 26

## 2022-07-08 PROCEDURE — 93010 ELECTROCARDIOGRAM REPORT: CPT

## 2022-07-08 DEVICE — SURGIFLO MATRIX WITH THROMBIN KIT: Type: IMPLANTABLE DEVICE | Status: FUNCTIONAL

## 2022-07-08 RX ORDER — SENNA PLUS 8.6 MG/1
2 TABLET ORAL AT BEDTIME
Refills: 0 | Status: DISCONTINUED | OUTPATIENT
Start: 2022-07-08 | End: 2022-07-12

## 2022-07-08 RX ORDER — SODIUM CHLORIDE 9 MG/ML
1000 INJECTION, SOLUTION INTRAVENOUS
Refills: 0 | Status: DISCONTINUED | OUTPATIENT
Start: 2022-07-08 | End: 2022-07-09

## 2022-07-08 RX ORDER — INSULIN LISPRO 100/ML
VIAL (ML) SUBCUTANEOUS AT BEDTIME
Refills: 0 | Status: DISCONTINUED | OUTPATIENT
Start: 2022-07-08 | End: 2022-07-09

## 2022-07-08 RX ORDER — MAGNESIUM HYDROXIDE 400 MG/1
30 TABLET, CHEWABLE ORAL EVERY 12 HOURS
Refills: 0 | Status: DISCONTINUED | OUTPATIENT
Start: 2022-07-08 | End: 2022-07-12

## 2022-07-08 RX ORDER — GLUCAGON INJECTION, SOLUTION 0.5 MG/.1ML
1 INJECTION, SOLUTION SUBCUTANEOUS ONCE
Refills: 0 | Status: DISCONTINUED | OUTPATIENT
Start: 2022-07-08 | End: 2022-07-08

## 2022-07-08 RX ORDER — DEXTROSE 50 % IN WATER 50 %
25 SYRINGE (ML) INTRAVENOUS ONCE
Refills: 0 | Status: DISCONTINUED | OUTPATIENT
Start: 2022-07-08 | End: 2022-07-09

## 2022-07-08 RX ORDER — SODIUM CHLORIDE 9 MG/ML
1000 INJECTION, SOLUTION INTRAVENOUS
Refills: 0 | Status: DISCONTINUED | OUTPATIENT
Start: 2022-07-08 | End: 2022-07-08

## 2022-07-08 RX ORDER — METFORMIN HYDROCHLORIDE 850 MG/1
1 TABLET ORAL
Qty: 0 | Refills: 0 | DISCHARGE

## 2022-07-08 RX ORDER — CYCLOBENZAPRINE HYDROCHLORIDE 10 MG/1
10 TABLET, FILM COATED ORAL EVERY 8 HOURS
Refills: 0 | Status: DISCONTINUED | OUTPATIENT
Start: 2022-07-08 | End: 2022-07-12

## 2022-07-08 RX ORDER — HYDROMORPHONE HYDROCHLORIDE 2 MG/ML
0.5 INJECTION INTRAMUSCULAR; INTRAVENOUS; SUBCUTANEOUS
Refills: 0 | Status: DISCONTINUED | OUTPATIENT
Start: 2022-07-08 | End: 2022-07-08

## 2022-07-08 RX ORDER — ACETAMINOPHEN 500 MG
650 TABLET ORAL EVERY 6 HOURS
Refills: 0 | Status: DISCONTINUED | OUTPATIENT
Start: 2022-07-08 | End: 2022-07-12

## 2022-07-08 RX ORDER — HYDROMORPHONE HYDROCHLORIDE 2 MG/ML
0.5 INJECTION INTRAMUSCULAR; INTRAVENOUS; SUBCUTANEOUS EVERY 4 HOURS
Refills: 0 | Status: DISCONTINUED | OUTPATIENT
Start: 2022-07-08 | End: 2022-07-12

## 2022-07-08 RX ORDER — DEXTROSE 50 % IN WATER 50 %
12.5 SYRINGE (ML) INTRAVENOUS ONCE
Refills: 0 | Status: DISCONTINUED | OUTPATIENT
Start: 2022-07-08 | End: 2022-07-09

## 2022-07-08 RX ORDER — GLUCAGON INJECTION, SOLUTION 0.5 MG/.1ML
1 INJECTION, SOLUTION SUBCUTANEOUS ONCE
Refills: 0 | Status: DISCONTINUED | OUTPATIENT
Start: 2022-07-08 | End: 2022-07-09

## 2022-07-08 RX ORDER — TRAMADOL HYDROCHLORIDE 50 MG/1
50 TABLET ORAL EVERY 6 HOURS
Refills: 0 | Status: DISCONTINUED | OUTPATIENT
Start: 2022-07-08 | End: 2022-07-12

## 2022-07-08 RX ORDER — DEXTROSE 50 % IN WATER 50 %
15 SYRINGE (ML) INTRAVENOUS ONCE
Refills: 0 | Status: DISCONTINUED | OUTPATIENT
Start: 2022-07-08 | End: 2022-07-09

## 2022-07-08 RX ORDER — INSULIN LISPRO 100/ML
VIAL (ML) SUBCUTANEOUS
Refills: 0 | Status: DISCONTINUED | OUTPATIENT
Start: 2022-07-08 | End: 2022-07-08

## 2022-07-08 RX ORDER — DEXTROSE 50 % IN WATER 50 %
25 SYRINGE (ML) INTRAVENOUS ONCE
Refills: 0 | Status: DISCONTINUED | OUTPATIENT
Start: 2022-07-08 | End: 2022-07-08

## 2022-07-08 RX ORDER — ONDANSETRON 8 MG/1
4 TABLET, FILM COATED ORAL ONCE
Refills: 0 | Status: DISCONTINUED | OUTPATIENT
Start: 2022-07-08 | End: 2022-07-08

## 2022-07-08 RX ORDER — PANTOPRAZOLE SODIUM 20 MG/1
40 TABLET, DELAYED RELEASE ORAL
Refills: 0 | Status: DISCONTINUED | OUTPATIENT
Start: 2022-07-08 | End: 2022-07-12

## 2022-07-08 RX ORDER — OXYCODONE HYDROCHLORIDE 5 MG/1
10 TABLET ORAL EVERY 4 HOURS
Refills: 0 | Status: DISCONTINUED | OUTPATIENT
Start: 2022-07-08 | End: 2022-07-11

## 2022-07-08 RX ORDER — CEFAZOLIN SODIUM 1 G
2000 VIAL (EA) INJECTION EVERY 8 HOURS
Refills: 0 | Status: COMPLETED | OUTPATIENT
Start: 2022-07-08 | End: 2022-07-09

## 2022-07-08 RX ORDER — ONDANSETRON 8 MG/1
4 TABLET, FILM COATED ORAL EVERY 6 HOURS
Refills: 0 | Status: DISCONTINUED | OUTPATIENT
Start: 2022-07-08 | End: 2022-07-12

## 2022-07-08 RX ORDER — DEXTROSE 50 % IN WATER 50 %
12.5 SYRINGE (ML) INTRAVENOUS ONCE
Refills: 0 | Status: DISCONTINUED | OUTPATIENT
Start: 2022-07-08 | End: 2022-07-08

## 2022-07-08 RX ORDER — INSULIN LISPRO 100/ML
VIAL (ML) SUBCUTANEOUS
Refills: 0 | Status: DISCONTINUED | OUTPATIENT
Start: 2022-07-08 | End: 2022-07-09

## 2022-07-08 RX ORDER — OXYCODONE HYDROCHLORIDE 5 MG/1
5 TABLET ORAL EVERY 6 HOURS
Refills: 0 | Status: DISCONTINUED | OUTPATIENT
Start: 2022-07-08 | End: 2022-07-12

## 2022-07-08 RX ORDER — DEXTROSE 50 % IN WATER 50 %
15 SYRINGE (ML) INTRAVENOUS ONCE
Refills: 0 | Status: DISCONTINUED | OUTPATIENT
Start: 2022-07-08 | End: 2022-07-08

## 2022-07-08 RX ORDER — INSULIN LISPRO 100/ML
VIAL (ML) SUBCUTANEOUS AT BEDTIME
Refills: 0 | Status: DISCONTINUED | OUTPATIENT
Start: 2022-07-08 | End: 2022-07-08

## 2022-07-08 RX ADMIN — Medication 2: at 11:14

## 2022-07-08 RX ADMIN — Medication 4: at 06:11

## 2022-07-08 RX ADMIN — Medication 1: at 23:08

## 2022-07-08 RX ADMIN — CYCLOBENZAPRINE HYDROCHLORIDE 10 MILLIGRAM(S): 10 TABLET, FILM COATED ORAL at 23:08

## 2022-07-08 RX ADMIN — ONDANSETRON 4 MILLIGRAM(S): 8 TABLET, FILM COATED ORAL at 17:26

## 2022-07-08 RX ADMIN — HYDROMORPHONE HYDROCHLORIDE 0.5 MILLIGRAM(S): 2 INJECTION INTRAMUSCULAR; INTRAVENOUS; SUBCUTANEOUS at 14:39

## 2022-07-08 RX ADMIN — HYDROMORPHONE HYDROCHLORIDE 0.5 MILLIGRAM(S): 2 INJECTION INTRAMUSCULAR; INTRAVENOUS; SUBCUTANEOUS at 14:24

## 2022-07-08 RX ADMIN — OXYCODONE HYDROCHLORIDE 10 MILLIGRAM(S): 5 TABLET ORAL at 22:22

## 2022-07-08 RX ADMIN — SODIUM CHLORIDE 75 MILLILITER(S): 9 INJECTION, SOLUTION INTRAVENOUS at 14:26

## 2022-07-08 RX ADMIN — Medication 975 MILLIGRAM(S): at 06:04

## 2022-07-08 RX ADMIN — Medication 500 MILLIGRAM(S): at 06:04

## 2022-07-08 RX ADMIN — OXYCODONE HYDROCHLORIDE 10 MILLIGRAM(S): 5 TABLET ORAL at 21:52

## 2022-07-08 RX ADMIN — SENNA PLUS 2 TABLET(S): 8.6 TABLET ORAL at 21:53

## 2022-07-08 RX ADMIN — HYDROMORPHONE HYDROCHLORIDE 0.5 MILLIGRAM(S): 2 INJECTION INTRAMUSCULAR; INTRAVENOUS; SUBCUTANEOUS at 16:26

## 2022-07-08 RX ADMIN — Medication 975 MILLIGRAM(S): at 07:04

## 2022-07-08 RX ADMIN — HYDROMORPHONE HYDROCHLORIDE 0.5 MILLIGRAM(S): 2 INJECTION INTRAMUSCULAR; INTRAVENOUS; SUBCUTANEOUS at 16:41

## 2022-07-08 RX ADMIN — Medication 100 MILLIGRAM(S): at 22:21

## 2022-07-08 RX ADMIN — Medication 2: at 16:35

## 2022-07-08 RX ADMIN — SODIUM CHLORIDE 75 MILLILITER(S): 9 INJECTION, SOLUTION INTRAVENOUS at 03:20

## 2022-07-08 NOTE — DISCHARGE NOTE PROVIDER - NSDCMRMEDTOKEN_GEN_ALL_CORE_FT
metFORMIN 850 mg oral tablet: 1 tab(s) orally once a day (in the morning)   metFORMIN 850 mg oral tablet: 1 tab(s) orally once a day (in the morning)  oxyCODONE 5 mg oral tablet: 1 tab(s) orally every 4 hours, As Needed -for moderate pain  for pain (7-10/10) MDD:4 tabs   Tylenol 325 mg oral tablet: 2 tab(s) orally every 4 hours, As Needed -for mild pain    Colace 100 mg oral capsule: 1 cap(s) orally once a day   metFORMIN 850 mg oral tablet: 1 tab(s) orally once a day (in the morning)  oxyCODONE 5 mg oral tablet: 1 tab(s) orally every 4 hours, As Needed -for moderate pain  for pain (7-10/10) MDD:4 tabs   Tylenol 325 mg oral tablet: 2 tab(s) orally every 4 hours, As Needed -for mild pain   Zofran 4 mg oral tablet: 1 tab(s) orally every 6 hours, As Needed -for nausea

## 2022-07-08 NOTE — HISTORY OF PRESENT ILLNESS
[Lower back] : lower back [9] : 9 [7] : 7 [Radiating] : radiating [Sharp] : sharp [Shooting] : shooting [Not working due to injury] : Work status: not working due to injury [de-identified] : WC DOI: 05/06/2022 pt hurt the right leg trying to stop someone from falling.\par Occ: CNA\par \par L spine MRI 6/15/22-\par Findings: Lordosis is maintained. Convex left curvature. No vertebral body compression fracture. No\par spondylolysis.\par T12-L1: No herniation, foraminal stenosis, or central stenosis. Facet hypertrophy.\par L1-L2: No herniation, foraminal stenosis, or central stenosis. Facet hypertrophy and facet effusion.\par L2-L3: No herniation, foraminal stenosis, or central stenosis. Facet hypertrophy and facet effusion.\par L3-L4: No herniation, foraminal stenosis, or central stenosis. Facet hypertrophy and facet effusion.\par L4-L5: No herniation, foraminal stenosis, or central stenosis. Facet hypertrophy and facet effusion.\par L5-S1: Loss of disc signal. Bulge, facet hypertrophy, ligamentum flavum hypertrophy, and facet effusion with\par no foraminal stenosis. Broad central and asymmetric to right herniation impressing on the thecal sac and S1\par nerve roots right greater than left with posterior displacement of the right S1 nerve root and moderate-to-severe\par right-sided canal stenosis.\par Ind. 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 Otoole notes 6/6/22- "Pt is a 46 year old female who presents for evaluation of her RT ankle. No trauma reported. Pt. was seen 5/24 and 5/31/2022 by [facility]: WILVER Gordon initially with c/o right leg pain since early May 2022. She is scheduled to see spine specialist, Dr. Tapia tomorrow. She experiences LBP and pain that radiates into her RT lower extremity. There is numbness/tingling in the RT leg. NSAIDS and muscle relaxer prn. Ambulating with a cane. No previous injury/problem with RT ankle."\par \par 6/7/22- Right-sided LBP radiating to  RLE (glute, lateral thigh/calf, toes 4-5, with N/T), especially with prolonged standing. Patient strained her back on 5/6/22 when trying to catch a patient who was falling. Alleviated with ibuprofen 800 mg and cyclobenzaprine. Denies prior lower back injuries/surgeries. No BB dysfunction. Has appointment for physical therapy 6/20/22. \par \par 6/17/22- MRI follow up. No b/b incontinence. Still severe RLE radicular pain to the toes.  [] : Post Surgical Visit: no [FreeTextEntry5] : pt is here for MRI results and states she has no improvement in her back.  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

## 2022-07-08 NOTE — H&P ADULT - NSHPLABSRESULTS_GEN_ALL_CORE
LABS:                        12.4   8.08  )-----------( 293      ( 07 Jul 2022 23:16 )             37.5      Vital Signs Last 24 Hrs  T(C): 36.7 (07 Jul 2022 21:40), Max: 36.7 (07 Jul 2022 21:40)  T(F): 98.1 (07 Jul 2022 21:40), Max: 98.1 (07 Jul 2022 21:40)  HR: 85 (07 Jul 2022 21:40) (85 - 88)  BP: 124/87 (07 Jul 2022 21:40) (124/87 - 131/87)  BP(mean): --  RR: 14 (07 Jul 2022 21:40) (14 - 16)  SpO2: 98% (07 Jul 2022 21:40) (98% - 98%)    Parameters below as of 07 Jul 2022 21:40  Patient On (Oxygen Delivery Method): room air        EKG: Ordered

## 2022-07-08 NOTE — DISCHARGE NOTE PROVIDER - CARE PROVIDER_API CALL
Tevin Tapia)  Manan Willson  Physicians  [address] [place] [address], [place] [address]  Phone: [phone]  Fax: [phone]  Follow Up Time:    Tevin Tapia)  Manan Willson  Physicians  [address] [place] [address], [place] [address]  Phone: [phone]  Fax: [phone]  Follow Up Time:     Bairon Murrell)  Internal Medicine  [address] [place] [address]  Phone: [phone]  Fax: [phone]  Follow Up Time: 1 week

## 2022-07-08 NOTE — PROGRESS NOTE ADULT - SUBJECTIVE AND OBJECTIVE BOX
46F presented with cauda equina syndrome now s/p L5-S1 laminectomy+discectomy POD0. Pt endorses mild back pain and nausea; denies vomiting, chest pain, SOB, headache.    LABS:                        11.6   7.38  )-----------( 265      ( 08 Jul 2022 08:40 )             34.9     07-08    138  |  106  |  14  ----------------------------<  254<H>  3.7   |  25  |  0.76    Ca    8.9      08 Jul 2022 08:40    TPro  7.6  /  Alb  3.3  /  TBili  0.2  /  DBili  x   /  AST  16  /  ALT  26  /  AlkPhos  73  07-07    PT/INR - ( 08 Jul 2022 08:40 )   PT: 11.6 sec;   INR: 0.98 ratio         PTT - ( 08 Jul 2022 08:40 )  PTT:34.7 sec      VITAL SIGNS:  T(C): 36.4 (07-08-22 @ 17:40), Max: 36.9 (07-08-22 @ 13:59)  HR: 75 (07-08-22 @ 17:40) (73 - 101)  BP: 137/89 (07-08-22 @ 17:40) (113/76 - 137/89)  RR: 18 (07-08-22 @ 17:40) (10 - 21)  SpO2: 94% (07-08-22 @ 17:40) (92% - 98%)    PE  Gen: NAD, AAOX3  Dressing CDI    Spine exam    UE:  C5-T1 SILT  5/5 motor elbow flexion/extension, wrist extension, finger flexion/abduction  +radial pulse  negative Gordon's    LE:  L2-S1 SILT  small area of numbness over R heel (pt states is chronic)  5/5 motor hip flexors, quads, hams, TA, EHL, FHL, GSC  + DP/PT pulses  negative Babinski    A/P  46F presented with cauda equina syndrome now s/p L5-S1 laminectomy and discectomy POD0. Stable.    - pain control  - DVT ppx: SCDs  - WBAT  - spine precautions (bending, lifting, twisting)  - Ancef 24hrs  - PT/OT  - ISS, carb control diet  - discussed with Dr. Tapia   46F presented with cauda equina syndrome now s/p L5-S1 bilateral laminectomy and right L5-S1 discectomy POD0. Pt endorses mild back pain and nausea; denies vomiting, chest pain, SOB, headache. Denies numbness, tingling, weakness.    LABS:                        11.6   7.38  )-----------( 265      ( 08 Jul 2022 08:40 )             34.9     07-08    138  |  106  |  14  ----------------------------<  254<H>  3.7   |  25  |  0.76    Ca    8.9      08 Jul 2022 08:40    TPro  7.6  /  Alb  3.3  /  TBili  0.2  /  DBili  x   /  AST  16  /  ALT  26  /  AlkPhos  73  07-07    PT/INR - ( 08 Jul 2022 08:40 )   PT: 11.6 sec;   INR: 0.98 ratio         PTT - ( 08 Jul 2022 08:40 )  PTT:34.7 sec      VITAL SIGNS:  T(C): 36.4 (07-08-22 @ 17:40), Max: 36.9 (07-08-22 @ 13:59)  HR: 75 (07-08-22 @ 17:40) (73 - 101)  BP: 137/89 (07-08-22 @ 17:40) (113/76 - 137/89)  RR: 18 (07-08-22 @ 17:40) (10 - 21)  SpO2: 94% (07-08-22 @ 17:40) (92% - 98%)    PE  Gen: NAD, AAOX3  Lumbar dressing CDI    SPINE:  Skin intact  NTTP over the bony prominences of the cervical // thoracic // lumbar // sacral spine  No bony step-offs  Grossly moving all extremities  + Radial Pulse  + DP/PT Pulses    MOTOR EXAM:                          Elbow Flex (C5)     Wrist Ext (C6)     Elbow Ext (C7)      Finger Flex (C8)    Finger Abduction (T1)  RIGHT                 5/5                         5/5                         5/5                          5/5                              5/5  LEFT                    5/5                         5/5                         5/5                          5/5                              5/5                           Hip Flex (L2)      Knee Ext (L3)      Ank Dorsiflex (L4)     Hallux Ext (L5)     Ank PlantarFlex (S1)  RIGHT               5/5                      5/5                          5/5                            5/5                           5/5  LEFT                  5/5                      5/5                          5/5                            5/5                           5/5      SENSORY EXAM:                        C5      C6      C7      C8       T1          RIGHT          2         2        2         2         2          (0=absent, 1=impaired, 2=normal, NT=not testable)  LEFT             2         2        2         2         2          (0=absent, 1=impaired, 2=normal, NT=not testable)                        L2        L3       L4      L5       S1          RIGHT        2          2         2        2        2           (0=absent, 1=impaired, 2=normal, NT=not testable)  LEFT           2          2         2        2        2           (0=absent, 1=impaired, 2=normal, NT=not testable)    Negative Gordon's sign bilaterally  Negative Babinski bilaterally     A/P  46F presented with cauda equina syndrome now s/p L5-S1 bilateral laminectomy and right L5-S1 discectomy POD0. Stable.    - pain control  - DVT ppx: SCDs  - WBAT  - spine precautions (no bending, lifting, twisting)  - Ancef 24hrs  - PT/OT  - ISS, carb control diet  - discussed with Dr. Tapia

## 2022-07-08 NOTE — H&P ADULT - ASSESSMENT
PHYSICAL EXAM  GEN: NAD, AAOx3    SPINE:  Skin intact  Minimally TTP over the bony prominences of the thoracic/lumbar spine  NTTP over the bony prominences of the cervical/sacral spine  No Paraspinal TTP of the cervical/thoracic/lumbar/sacral spine  No bony step-offs  Grossly moving all extremities  + Radial Pulse  + DP/PT Pulses  No saddle anesthesia  + rectal tone      MOTOR EXAM:                          Elbow Flex (C5)     Wrist Ext (C6)     Elbow Ext (C7)      Finger Flex (C8)    Finger Abduction (T1)  RIGHT                 5/5                         5/5                         5/5                          5/5                              5/5  LEFT                    5/5                         5/5                         5/5                          5/5                              5/5                           Hip Flex (L2)      Knee Ext (L3)      Ank Dorsiflex (L4)     Hallux Ext (L5)     Ank PlantarFlex (S1)  RIGHT               4+/5                      5/5                          5/5                            4+/5                           5/5  LEFT                  5/5                      5/5                          5/5                            5/5                           5/5      SENSORY EXAM:                        C5      C6      C7      C8       T1          RIGHT          2         2        2         2         2          (0=absent, 1=impaired, 2=normal, NT=not testable)  LEFT             2         2        2         2         2          (0=absent, 1=impaired, 2=normal, NT=not testable)                        L2        L3       L4      L5       S1          RIGHT        2          2         2        2        2           (0=absent, 1=impaired, 2=normal, NT=not testable)  LEFT           2          2         2        2        2           (0=absent, 1=impaired, 2=normal, NT=not testable)    Negative Gordon's sign bilaterally  Negative Babinski bilaterally   Negative Myoclonus bilaterally      A/P: 46F w/ L5-S1 HNP, RLE Radiculopathy, with 1x episode of bladder incontinence, concern for cord compression    -Red flag s/sx of cord compression/cauda equina discussed  -Patient sent in from office of Dr. Tapia for x1 episode of bladder incontinence findings concerning for intermittent cord compression   -Large L5-S1 HNP noted on outpatient imaging  -Plan for L5-S1 Laminectomy, discectomy w/ Dr. Tapia AM 7/8  -NPO after midnight for OR 7/8  -IVF while NPO  -Risks v Benefits of surgical vs non-surgical management discussed, patient wishes to proceed with surgery   -Patient verbalized understanding and all questions answered   -Please hold chemical VTE ppx for OR 7/8   -SCDs  -IS encouraged  -WBAT  -PT/OT  -OOBTC  -No heavy lifting   -No medical optimization required per Anesthesia as patient is healthy female  -Discussed w/ Dr. Tapia who is aware and approves  -Will advise if plan changes

## 2022-07-08 NOTE — DISCHARGE NOTE PROVIDER - NSDCCPTREATMENT_GEN_ALL_CORE_FT
PRINCIPAL PROCEDURE  Procedure: Lumbar laminectomy  Findings and Treatment: L5-S1 bilateral laminectomy, R L5-S1 discectomy

## 2022-07-08 NOTE — H&P ADULT - ATTENDING COMMENTS
Given evolving symptoms including loss of bladder control and large L5/S1 HNP with severe canal stenosis, advised surgical intervention to decompress the neural elements.   Discussed risks of watchful waiting with patient and that progressive loss of BB control portends a poor prognosis for recovery and could lead to permanent neurologic injury.   Plan for OR

## 2022-07-08 NOTE — H&P ADULT - HISTORY OF PRESENT ILLNESS
Orthopaedic Spine                                                 CHIEF COMPLAINT: RLE Radicular Symptoms, weakness, x1 episode of bladder incontinence Saturday    HPI: Pt is a 46y s/p injury at work on May 6, 2022 who was sent in from the office by Dr. Tapia after reports loss of bladder function Saturday. Patient reports that she injured herself at work, and has had RLE radicular type symptoms, particularly burning shooting down her thigh to her toes. Pt was seen by Dr. Tapia as an outpatient, where imaging obtained demonstrated a L5-S1 HNP, causing compression on the spinal cord. In ED pt complaining of back pain. XR, CT, MR were deferred 2/2 to them being completed as an outpatient. Patient denies additional injury Negative head strike. Able to walk after incident, however has had an antalgic gait limiting her function and quality of life. Denies saddle anesthesia. Reports pain down right leg and RLE paresthesias, LLE denies pain down leg or paresthesias. Denies incontinence of bowel or bladder aside from episode aforementioned on Saturday. Patient denies CP/SOB/N/V. Patient is nervous regarding surgical intervention, which is appropriate. All questions answered to patient's satisfaction.     Pt states she is in good health, follows with her doctors regularly. She takes Metformin 850 for Diabetes daily. No other medical issues. Patient interviewed and examined with  at bedside.     PAST MEDICAL & SURGICAL HISTORY:  Polyp of corpus uteri      H/O dilation and curettage    FAMILY HISTORY:  Family history of diabetes mellitus (DM)

## 2022-07-08 NOTE — HISTORY OF PRESENT ILLNESS
[Lower back] : lower back [8] : 8 [7] : 7 [Radiating] : radiating [Sharp] : sharp [Shooting] : shooting [Intermittent] : intermittent [Rest] : rest [Meds] : meds [Walking] : walking [Not working due to injury] : Work status: not working due to injury [de-identified] : WC DOI: 05/06/2022 pt hurt the right leg trying to stop someone from falling.\par Occ: CNA\par \par L spine MRI 6/15/22-\par Findings: Lordosis is maintained. Convex left curvature. No vertebral body compression fracture. No\par spondylolysis.\par T12-L1: No herniation, foraminal stenosis, or central stenosis. Facet hypertrophy.\par L1-L2: No herniation, foraminal stenosis, or central stenosis. Facet hypertrophy and facet effusion.\par L2-L3: No herniation, foraminal stenosis, or central stenosis. Facet hypertrophy and facet effusion.\par L3-L4: No herniation, foraminal stenosis, or central stenosis. Facet hypertrophy and facet effusion.\par L4-L5: No herniation, foraminal stenosis, or central stenosis. Facet hypertrophy and facet effusion.\par L5-S1: Loss of disc signal. Bulge, facet hypertrophy, ligamentum flavum hypertrophy, and facet effusion with\par no foraminal stenosis. Broad central and asymmetric to right herniation impressing on the thecal sac and S1\par nerve roots right greater than left with posterior displacement of the right S1 nerve root and moderate-to-severe\par right-sided canal stenosis.\par Ind. 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 Otoole notes 6/6/22- "Pt is a 46 year old female who presents for evaluation of her RT ankle. No trauma reported. Pt. was seen 5/24 and 5/31/2022 by [facility]: WILVER Gordon initially with c/o right leg pain since early May 2022. She is scheduled to see spine specialist, Dr. Tapia tomorrow. She experiences LBP and pain that radiates into her RT lower extremity. There is numbness/tingling in the RT leg. NSAIDS and muscle relaxer prn. Ambulating with a cane. No previous injury/problem with RT ankle."\par \par 6/7/22- Right-sided LBP radiating to  RLE (glute, lateral thigh/calf, toes 4-5, with N/T), especially with prolonged standing. Patient strained her back on 5/6/22 when trying to catch a patient who was falling. Alleviated with ibuprofen 800 mg and cyclobenzaprine. Denies prior lower back injuries/surgeries. No BB dysfunction. Has appointment for physical therapy 6/20/22. \par \par 6/17/22- MRI follow up. No b/b incontinence. Still severe RLE radicular pain to the toes. \par 7/7/22- Still LBP radiating down the RLE with N/T in the lateral toes and plantar foot. Taking flexeril, kashif, IBU with mild relief. Reports she did have an episode of loss of bladder control 5 nights ago, this is new for her. Was a full void, not a small amount of urine. Was unaware she needed to void. Did not seek medical attention at that time.  Denies saddle anesthesia.  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

## 2022-07-08 NOTE — DISCHARGE NOTE PROVIDER - HOSPITAL COURSE
The patient is a 46 year old F status post L5-S1 laminectomy and right L5-S1 discectomy. Patient presented to [facility] with cauda equina syndrome. The patient was taken to the operating room on date mentioned above. Prophylactic antibiotics were started before the procedure and continued for 24 hours. There were no complications during the procedure and patient tolerated the procedure well. The patient was transferred to the recovery room in stable condition and subsequently to the surgical floor. The patient was placed on SCDs for DVT ppx. All home medications were continued. The patient received physical therapy daily and daily labs were followed. The dressing was kept clean, dry, intact. The rest of the hospital stay was unremarkable.

## 2022-07-08 NOTE — ASSESSMENT
[FreeTextEntry1] : Has symptoms concerning for cauda equina syndrome with loss of bladder control. Discussed with patient that loss of bladder control portends worse outcomes in [place] and that another episode of incontinence and continued neural element compression could lead to permanent neurologic dysfunction. \par \par Patient has had >6 weeks of severe pain and RLE radiculopathy, without any relief from medications including toradol, PO NSAIDs, muscle relaxants. Has severe HNP L5/S1 with neural element compression and severe central stenosis. Given evolving symptoms, now including loss of bladder control, have advised patient to present to ED for surgical intervention this evening. \par \par Discussed bilateral L5/S1 laminectomy, discectomy \par \par NSAIDs- Patient warned of risk of medication to GI tract, increased blood pressure, cardiac risk, and risk of fluid retention.  Advised to clear medication with internist or PCP if any concurrent health problem with heart, blood pressure, or GI system exists.\par \par Gabapentin- Patient advised of sedating effects, instructed not to drive, operate machinery, or take with other sedating medications. Advised of need to taper on/off medication and risk of abruptly stopping gabapentin. \par \par Laminectomy, Discectomy- We've discussed the surgery details as well as potential for complications including but not limited to pain, scar, bleeding and infection. There is also a possibility for recurrent or residual disk herniation, failure or fracture of bone, and need for future surgery. Finally, we discussed potential for injury to nerves, the spinal cord either transient or permanent, damage to blood vessels, CSF leak, blindness, need for transfusion, and medical complications. The patient verbalized understanding and all questions were answered.

## 2022-07-08 NOTE — BRIEF OPERATIVE NOTE - NSICDXBRIEFPROCEDURE_GEN_ALL_CORE_FT
PROCEDURES:  Lumbar laminectomy 08-Jul-2022 14:54:50 L5-S1 bilateral laminectomy, R L5-S1 discectomy Richard Phillips

## 2022-07-08 NOTE — WORK
[Sprain/Strain] : sprain/strain [Other: ___] : [unfilled] [Was the competent medical cause of the injury] : was the competent medical cause of the injury [Are consistent with the injury] : are consistent with the injury [Consistent with my objective findings] : consistent with my objective findings [Total] : total [Cannot return to work because ________] : cannot return to work because [unfilled] [Rx may affect patient's ability to return to work, make patient drowsy, or other issue] : Rx may affect patient's ability to return to work, make patient drowsy, or other issue. [I provided the services listed above] :  I provided the services listed above.

## 2022-07-08 NOTE — DISCHARGE NOTE PROVIDER - PROVIDER TOKENS
PROVIDER:[TOKEN:[[id number]]] PROVIDER:[TOKEN:[[id number]]],PROVIDER:[TOKEN:[[id number]],FOLLOWUP:[1 week]]

## 2022-07-08 NOTE — HISTORY OF PRESENT ILLNESS
[Lower back] : lower back [Right Leg] : right leg [Sudden] : sudden [9] : 9 [7] : 7 [Sharp] : sharp [Shooting] : shooting [de-identified] : WC DOI: 05/06/2022 pt hurt the right leg trying to stop someone from falling.\par Occ: CNA\par \par Dr. Otoole notes 6/6/22- "Pt is a 46 year old female who presents for evaluation of her RT ankle. No trauma reported. Pt. was seen 5/24 and 5/31/2022 by [facility]: WILVER Gordon initially with c/o right leg pain since early May 2022. She is scheduled to see spine specialist, Dr. Tapia tomorrow. She experiences LBP and pain that radiates into her RT lower extremity. There is numbness/tingling in the RT leg. NSAIDS and muscle relaxer prn. Ambulating with a cane. No previous injury/problem with RT ankle."\par \par 6/7/22- Right-sided LBP radiating to  RLE (glute, lateral thigh/calf, toes 4-5, with N/T), especially with prolonged standing. Patient strained her back on 5/6/22 when trying to catch a patient who was falling. Alleviated with ibuprofen 800 mg and cyclobenzaprine. Denies prior lower back injuries/surgeries. No BB dysfunction. Has appointment for physical therapy 6/20/22.  [] : no [FreeTextEntry5] : pt strained the back while preventing a patient from falling 05/06/2022 [FreeTextEntry7] : down the right leg  [de-identified] : 05/31/2022 [de-identified] : WILVER Gordon [de-identified] : x-ray

## 2022-07-08 NOTE — DISCHARGE NOTE PROVIDER - NSDCCPCAREPLAN_GEN_ALL_CORE_FT
PRINCIPAL DISCHARGE DIAGNOSIS  Diagnosis: Spinal cord compression  Assessment and Plan of Treatment:        PRINCIPAL DISCHARGE DIAGNOSIS  Diagnosis: Spinal cord compression  Assessment and Plan of Treatment:       SECONDARY DISCHARGE DIAGNOSES  Diagnosis: Diabetes mellitus  Assessment and Plan of Treatment: -hemoglobin a1c is 12 on admssion and previous value was 6.2  - this is likely due to steroid use and recent operative procedure  - would recommend increasing Metformin to 1000 mg BID on discharge with Glipizide 5mg and/or starting Lantus 8 units QHS while you are on steroids   - You will need to follow up with your PMD Dr. Murrell for diabetic management and you will need to inform him that your hemoglobin A1c during this hospital stay was 12.1

## 2022-07-08 NOTE — ASSESSMENT
[FreeTextEntry1] : Patient seen by non-spine partners in the past.\par Persistent RLE radicular pain, under the care of my partners, indicated for lumbar MRI to eval for HNP\par C/w meds, PT\par \par NSAIDs- Patient warned of risk of medication to GI tract, increased blood pressure, cardiac risk, and risk of fluid retention.  Advised to clear medication with internist or PCP if any concurrent health problem with heart, blood pressure, or GI system exists.\par Gabapentin- Patient advised of sedating effects, instructed not to drive, operate machinery, or take with other sedating medications. Advised of need to taper on/off medication and risk of abruptly stopping gabapentin.

## 2022-07-08 NOTE — DISCHARGE NOTE PROVIDER - NSDCFUADDINST_GEN_ALL_CORE_FT
1. Pain control as needed. An electronic prescription for pain medicine has been sent to your pharmacy. Please  on your way home.  2. Walking with full weight bearing as tolerated, with assistive devices (walker/cane) as needed  3. Physical therapy as needed  4. Follow up with Dr. Tapia as outpatient in 7-10 days after discharge from the hospital or rehab, call office for appointment  5. Keep dressing clean and dry.  6. No baths/hot tubs or soaks.   1. Pain control as needed. An electronic prescription for pain medicine has been sent to your pharmacy. Please  on your way home.  2. Walking with full weight bearing as tolerated, with assistive devices (walker/cane) as needed  3. Physical therapy as needed. Do not twist, bend, or flex the spine.  4. Follow up with Dr. Tapia as outpatient in 7-10 days after discharge from the hospital or rehab, call office for appointment  5. Keep dressing clean and dry.  6. No baths/hot tubs or soaks.

## 2022-07-08 NOTE — DISCHARGE NOTE PROVIDER - NSDCHHNEEDSERVICE_GEN_ALL_CORE
Observation and assessment/Rehabilitation services/Wound care and assessment Observation and assessment/Rehabilitation services/Teaching and training/Wound care and assessment

## 2022-07-08 NOTE — PROGRESS NOTE ADULT - SUBJECTIVE AND OBJECTIVE BOX
Patient seen and examined at bedside this AM. No acute complaints at this time. Pain well controlled. Denies NEW weakness, numbness or tingling. Denies chest pain, shortness of breath, nausea or vomiting. Denies saddle anesthesia, bowel/bladder incontinence. Reports continued RLE radicular type pain.     PE:  Vital Signs Last 24 Hrs  T(C): 36.8 (07-08-22 @ 06:07), Max: 36.8 (07-08-22 @ 00:42)  T(F): 98.2 (07-08-22 @ 06:07), Max: 98.2 (07-08-22 @ 00:42)  HR: 88 (07-08-22 @ 06:07) (85 - 98)  BP: 136/84 (07-08-22 @ 06:07) (116/78 - 136/84)  BP(mean): --  RR: 17 (07-08-22 @ 06:07) (14 - 18)  SpO2: 95% (07-08-22 @ 06:07) (95% - 98%)    General: NAD, resting comfortably in bed  SPINE:  Skin intact  Minimally TTP over the bony prominences of the thoracic/lumbar spine  NTTP over the bony prominences of the cervical/sacral spine  No Paraspinal TTP of the cervical/thoracic/lumbar/sacral spine  No bony step-offs  Grossly moving all extremities  + Radial Pulse  + DP/PT Pulses  No saddle anesthesia  + rectal tone    Negative Gordon's sign bilaterally  Negative Babinski bilaterally   Negative Myoclonus bilaterally    Motor:                   C5                C6              C7               C8           T1   R             5/5                5/5            5/5              5/5          5/5  L             5/5                5/5            5/5              5/5          5/5                    L2                  L3             L4              L5            S1  R            4+/5                5/5             5/5            4+/5          5/5  L             5/5                5/5            5/5            5/5          5/5    Sensory:            C5         C6         C7      C8       T1        (0=absent, 1=impaired, 2=normal, NT=not testable)  R         2            2           2        2         2  L          2            2           2        2         2               L2          L3         L4      L5       S1         (0=absent, 1=impaired, 2=normal, NT=not testable)  R         2            2            2        2        2  L          2            2           2        2         2                              12.4   8.08  )-----------( 293      ( 07 Jul 2022 23:16 )             37.5     07 Jul 2022 23:16    140    |  104    |  12     ----------------------------<  217    3.5     |  30     |  0.76     Ca    9.0        07 Jul 2022 23:16    TPro  7.6    /  Alb  3.3    /  TBili  0.2    /  DBili  x      /  AST  16     /  ALT  26     /  AlkPhos  73     07 Jul 2022 23:16    PT/INR - ( 07 Jul 2022 23:16 )   PT: 12.1 sec;   INR: 1.01 ratio         PTT - ( 07 Jul 2022 23:16 )  PTT:37.2 sec      A/P: 46F w/ L5-S1 HNP, RLE Radiculopathy, with 1x episode of bladder incontinence, concern for cord compression    -Red flag s/sx of cord compression/cauda equina discussed  -Patient sent in from office of Dr. Tapia for x1 episode of bladder incontinence findings concerning for intermittent cord compression   -Large L5-S1 HNP noted on outpatient imaging  -Plan for L5-S1 Laminectomy, discectomy w/ Dr. Tapia AM 7/8  -NPO after midnight for OR 7/8  -IVF while NPO  -Risks v Benefits of surgical vs non-surgical management discussed, patient wishes to proceed with surgery   -Patient verbalized understanding and all questions answered   -Please hold chemical VTE ppx for OR 7/8   -SCDs  -IS encouraged  -WBAT  -PT/OT  -OOBTC  -No heavy lifting   -No medical optimization required per Anesthesia as patient is healthy female  -Discussed w/ Dr. Tapia who is aware and approves  -Will advise if plan changes

## 2022-07-08 NOTE — PATIENT PROFILE ADULT - FALL HARM RISK - HARM RISK INTERVENTIONS

## 2022-07-08 NOTE — H&P ADULT - PATIENT'S GENDER IDENTITY
What Is The Reason For Today's Visit?: Full Body Skin Examination with No Concerns What Is The Reason For Today's Visit? (Being Monitored For X): concerning skin lesions on an annual basis Female

## 2022-07-08 NOTE — WORK
[Other: ___] : [unfilled] [Was the competent medical cause of the injury] : was the competent medical cause of the injury [Are consistent with the injury] : are consistent with the injury [Consistent with my objective findings] : consistent with my objective findings [Total] : total [Cannot return to work because ________] : cannot return to work because [unfilled] [Rx may affect patient's ability to return to work, make patient drowsy, or other issue] : Rx may affect patient's ability to return to work, make patient drowsy, or other issue. [I provided the services listed above] :  I provided the services listed above.

## 2022-07-08 NOTE — WORK
[Sprain/Strain] : sprain/strain [Other: ___] : [unfilled] [Was the competent medical cause of the injury] : was the competent medical cause of the injury [Are consistent with the injury] : are consistent with the injury [Consistent with my objective findings] : consistent with my objective findings [Total] : total [Does not reveal pre-existing condition(s) that may affect treatment/prognosis] : does not reveal pre-existing condition(s) that may affect treatment/prognosis [I provided the services listed above] :  I provided the services listed above.

## 2022-07-08 NOTE — H&P ADULT - NSHPLANGLIMITEDENGLISH_GEN_A_CORE
February 13, 2019      Shelly Chao MD  [address]  [facility] [address]           [facility]  [address] [address]  Phone: [phone]  Fax: [phone]          Patient: Ankur Seymour   MR Number: [id number]   YOB: 2012   Date of Visit: 2/13/2019       Dear Dr. Shelly Chao:    Thank you for referring Ankur Seymour to me for evaluation. Attached you will find relevant portions of my assessment and plan of care.    If you have questions, please do not hesitate to call me. I look forward to following Ankur Seymour along with you.    Sincerely,    Tabitha Schwartz, PhD    Enclosure  CC:  No Recipients    If you would like to receive this communication electronically, please contact [email or web address] or [phone] to request more information on [employer] access.    For providers and/or their staff who would like to refer a patient to [facility], please contact us through our one-stop-shop provider referral line, [facility], at [phone].    If you feel you have received this communication in error or would no longer like to receive these types of communications, please e-mail [email or web address]          No

## 2022-07-08 NOTE — PATIENT PROFILE ADULT - FUNCTIONAL ASSESSMENT - DAILY ACTIVITY 3.
Curettage Text: The wound bed was treated with curettage after the biopsy was performed. 4 = No assist / stand by assistance

## 2022-07-08 NOTE — ASSESSMENT
[FreeTextEntry1] : Advised of symptoms of BRIELLE and to present to ED should she experience them\par Patient has had >6 weeks of severe pain and RLE radiculopathy, without any relief from medications including toradol, PO NSAIDs, muscle relaxants. Has severe HNP L5/S1 with neural element compression. Given duration of symptoms and severity of HNP, which was demonstrated for patient on MRI, discussed continued non-operative treatments including PT, medications, LESI vs. surgical intervention. Patient interested in more definitive intervention. \par Discussed bilateral L5/S1 laminectomy, discectomy \par \par NSAIDs- Patient warned of risk of medication to GI tract, increased blood pressure, cardiac risk, and risk of fluid retention.  Advised to clear medication with internist or PCP if any concurrent health problem with heart, blood pressure, or GI system exists.\par \par Gabapentin- Patient advised of sedating effects, instructed not to drive, operate machinery, or take with other sedating medications. Advised of need to taper on/off medication and risk of abruptly stopping gabapentin. \par \par Laminectomy, Discectomy- We've discussed the surgery details as well as potential for complications including but not limited to pain, scar, bleeding and infection. There is also a possibility for recurrent or residual disk herniation, failure or fracture of bone, and need for future surgery. Finally, we discussed potential for injury to nerves, the spinal cord either transient or permanent, damage to blood vessels, CSF leak, blindness, need for transfusion, and medical complications. The patient verbalized understanding and all questions were answered.

## 2022-07-08 NOTE — IMAGING
[de-identified] : LSPINE\par Inspection: No skin discoloration\par Palpation: Midline sacral tenderness, R lumbar paraspinal tenderness. R GT tenderness to palpation. \par ROM: diminished in all planes\par Strength: 5/5 bilateral hip flexors, knee extensors, ankle dorsiflexors, EHL, Left ankle plantarflexors; 4/5 Right Gastroc-Soleus with good effort and unable to perform single leg heel rise on right\par Sensation: Diminished sensation of RLE above and below the knee compared to LLE localized laterally to lateral border of foot. No saddle anesthesia\par Provocative maneuvers: Positive R seated straight leg raise\par \par Bilateral hips-\par Palpation: No tenderness to palpation over greater trochanter or IT band on left; TTP R greater troch (mild)\par ROM: No pain with flexion and internal rotation

## 2022-07-08 NOTE — IMAGING
[de-identified] : LSPINE\par Inspection: \par Palpation: Midline sacral tenderness, R lumbar paraspinal tenderness. R GT tenderness to palpation. \par ROM: diminished in all planes\par Strength: 5/5 bilateral hip flexors, knee extensors, ankle dorsiflexors, EHL, Left ankle plantarflexors; 4/5 Right Gastroc-Soleus with good effort and unable to perform single leg heel rise on right\par Sensation: Diminished sensation of RLE above and below the knee compared to LLE localized laterally \par Provocative maneuvers: Positive R seated straight leg raise\par \par Bilateral hips-\par Palpation: No tenderness to palpation over greater trochanter or IT band on left; TTP R greater troch\par ROM: No pain with flexion and internal rotation

## 2022-07-08 NOTE — IMAGING
[Straightening consistent with spasm] : Straightening consistent with spasm [AP] : anteroposterior [There are no fractures, subluxations or dislocations. No significant abnormalities are seen] : There are no fractures, subluxations or dislocations. No significant abnormalities are seen [de-identified] : LSPINE\par Palpation: Midline sacral tenderness, R lumbar paraspinal tenderness. R GT tenderness to palpation. \par ROM: diminished in all planes\par Strength: 4/5 bilateral hip flexors, knee extensors, ankle dorsiflexors, EHL, ankle plantarflexors (limited due to pain)\par Sensation: Diminished sensation of RLE above and below the knee compared to LLE localized laterally \par Provocative maneuvers: Positive R seated straight leg raise\par \par

## 2022-07-09 DIAGNOSIS — R68.89 OTHER GENERAL SYMPTOMS AND SIGNS: ICD-10-CM

## 2022-07-09 DIAGNOSIS — E11.65 TYPE 2 DIABETES MELLITUS WITH HYPERGLYCEMIA: ICD-10-CM

## 2022-07-09 LAB
ANION GAP SERPL CALC-SCNC: 8 MMOL/L — SIGNIFICANT CHANGE UP (ref 5–17)
BASOPHILS # BLD AUTO: 0.01 K/UL — SIGNIFICANT CHANGE UP (ref 0–0.2)
BASOPHILS NFR BLD AUTO: 0.1 % — SIGNIFICANT CHANGE UP (ref 0–2)
BUN SERPL-MCNC: 11 MG/DL — SIGNIFICANT CHANGE UP (ref 7–23)
CALCIUM SERPL-MCNC: 8.9 MG/DL — SIGNIFICANT CHANGE UP (ref 8.5–10.1)
CHLORIDE SERPL-SCNC: 102 MMOL/L — SIGNIFICANT CHANGE UP (ref 96–108)
CO2 SERPL-SCNC: 26 MMOL/L — SIGNIFICANT CHANGE UP (ref 22–31)
CREAT SERPL-MCNC: 0.86 MG/DL — SIGNIFICANT CHANGE UP (ref 0.5–1.3)
EGFR: 85 ML/MIN/1.73M2 — SIGNIFICANT CHANGE UP
EOSINOPHIL # BLD AUTO: 0 K/UL — SIGNIFICANT CHANGE UP (ref 0–0.5)
EOSINOPHIL NFR BLD AUTO: 0 % — SIGNIFICANT CHANGE UP (ref 0–6)
GLUCOSE BLDC GLUCOMTR-MCNC: 267 MG/DL — HIGH (ref 70–99)
GLUCOSE BLDC GLUCOMTR-MCNC: 285 MG/DL — HIGH (ref 70–99)
GLUCOSE BLDC GLUCOMTR-MCNC: 318 MG/DL — HIGH (ref 70–99)
GLUCOSE BLDC GLUCOMTR-MCNC: 324 MG/DL — HIGH (ref 70–99)
GLUCOSE BLDC GLUCOMTR-MCNC: 406 MG/DL — HIGH (ref 70–99)
GLUCOSE SERPL-MCNC: 299 MG/DL — HIGH (ref 70–99)
HCT VFR BLD CALC: 37.1 % — SIGNIFICANT CHANGE UP (ref 34.5–45)
HGB BLD-MCNC: 12.2 G/DL — SIGNIFICANT CHANGE UP (ref 11.5–15.5)
IMM GRANULOCYTES NFR BLD AUTO: 0.4 % — SIGNIFICANT CHANGE UP (ref 0–1.5)
LYMPHOCYTES # BLD AUTO: 1.09 K/UL — SIGNIFICANT CHANGE UP (ref 1–3.3)
LYMPHOCYTES # BLD AUTO: 7.9 % — LOW (ref 13–44)
MCHC RBC-ENTMCNC: 29.5 PG — SIGNIFICANT CHANGE UP (ref 27–34)
MCHC RBC-ENTMCNC: 32.9 G/DL — SIGNIFICANT CHANGE UP (ref 32–36)
MCV RBC AUTO: 89.6 FL — SIGNIFICANT CHANGE UP (ref 80–100)
MONOCYTES # BLD AUTO: 0.79 K/UL — SIGNIFICANT CHANGE UP (ref 0–0.9)
MONOCYTES NFR BLD AUTO: 5.7 % — SIGNIFICANT CHANGE UP (ref 2–14)
NEUTROPHILS # BLD AUTO: 11.83 K/UL — HIGH (ref 1.8–7.4)
NEUTROPHILS NFR BLD AUTO: 85.9 % — HIGH (ref 43–77)
NRBC # BLD: 0 /100 WBCS — SIGNIFICANT CHANGE UP (ref 0–0)
PLATELET # BLD AUTO: 260 K/UL — SIGNIFICANT CHANGE UP (ref 150–400)
POTASSIUM SERPL-MCNC: 4.1 MMOL/L — SIGNIFICANT CHANGE UP (ref 3.5–5.3)
POTASSIUM SERPL-SCNC: 4.1 MMOL/L — SIGNIFICANT CHANGE UP (ref 3.5–5.3)
RBC # BLD: 4.14 M/UL — SIGNIFICANT CHANGE UP (ref 3.8–5.2)
RBC # FLD: 13.1 % — SIGNIFICANT CHANGE UP (ref 10.3–14.5)
SODIUM SERPL-SCNC: 136 MMOL/L — SIGNIFICANT CHANGE UP (ref 135–145)
WBC # BLD: 13.78 K/UL — HIGH (ref 3.8–10.5)
WBC # FLD AUTO: 13.78 K/UL — HIGH (ref 3.8–10.5)

## 2022-07-09 RX ORDER — INSULIN LISPRO 100/ML
VIAL (ML) SUBCUTANEOUS
Refills: 0 | Status: DISCONTINUED | OUTPATIENT
Start: 2022-07-09 | End: 2022-07-12

## 2022-07-09 RX ORDER — DOCUSATE SODIUM 100 MG
1 CAPSULE ORAL
Qty: 7 | Refills: 0
Start: 2022-07-09 | End: 2022-07-15

## 2022-07-09 RX ORDER — SODIUM CHLORIDE 9 MG/ML
1000 INJECTION, SOLUTION INTRAVENOUS
Refills: 0 | Status: DISCONTINUED | OUTPATIENT
Start: 2022-07-09 | End: 2022-07-12

## 2022-07-09 RX ORDER — OXYCODONE HYDROCHLORIDE 5 MG/1
1 TABLET ORAL
Qty: 30 | Refills: 0
Start: 2022-07-09 | End: 2022-07-13

## 2022-07-09 RX ORDER — ACETAMINOPHEN 500 MG
2 TABLET ORAL
Qty: 60 | Refills: 0
Start: 2022-07-09 | End: 2022-07-13

## 2022-07-09 RX ORDER — DEXTROSE 50 % IN WATER 50 %
25 SYRINGE (ML) INTRAVENOUS ONCE
Refills: 0 | Status: DISCONTINUED | OUTPATIENT
Start: 2022-07-09 | End: 2022-07-12

## 2022-07-09 RX ORDER — INSULIN GLARGINE 100 [IU]/ML
6 INJECTION, SOLUTION SUBCUTANEOUS AT BEDTIME
Refills: 0 | Status: DISCONTINUED | OUTPATIENT
Start: 2022-07-09 | End: 2022-07-12

## 2022-07-09 RX ORDER — DEXTROSE 50 % IN WATER 50 %
12.5 SYRINGE (ML) INTRAVENOUS ONCE
Refills: 0 | Status: DISCONTINUED | OUTPATIENT
Start: 2022-07-09 | End: 2022-07-12

## 2022-07-09 RX ORDER — INSULIN LISPRO 100/ML
VIAL (ML) SUBCUTANEOUS AT BEDTIME
Refills: 0 | Status: DISCONTINUED | OUTPATIENT
Start: 2022-07-09 | End: 2022-07-12

## 2022-07-09 RX ORDER — GLUCAGON INJECTION, SOLUTION 0.5 MG/.1ML
1 INJECTION, SOLUTION SUBCUTANEOUS ONCE
Refills: 0 | Status: DISCONTINUED | OUTPATIENT
Start: 2022-07-09 | End: 2022-07-12

## 2022-07-09 RX ORDER — INSULIN LISPRO 100/ML
2 VIAL (ML) SUBCUTANEOUS
Refills: 0 | Status: DISCONTINUED | OUTPATIENT
Start: 2022-07-09 | End: 2022-07-12

## 2022-07-09 RX ORDER — METFORMIN HYDROCHLORIDE 850 MG/1
1000 TABLET ORAL EVERY 12 HOURS
Refills: 0 | Status: DISCONTINUED | OUTPATIENT
Start: 2022-07-09 | End: 2022-07-12

## 2022-07-09 RX ORDER — ONDANSETRON 8 MG/1
1 TABLET, FILM COATED ORAL
Qty: 28 | Refills: 0
Start: 2022-07-09 | End: 2022-07-15

## 2022-07-09 RX ORDER — DEXTROSE 50 % IN WATER 50 %
15 SYRINGE (ML) INTRAVENOUS ONCE
Refills: 0 | Status: DISCONTINUED | OUTPATIENT
Start: 2022-07-09 | End: 2022-07-12

## 2022-07-09 RX ADMIN — Medication 4: at 11:39

## 2022-07-09 RX ADMIN — INSULIN GLARGINE 6 UNIT(S): 100 INJECTION, SOLUTION SUBCUTANEOUS at 21:12

## 2022-07-09 RX ADMIN — OXYCODONE HYDROCHLORIDE 10 MILLIGRAM(S): 5 TABLET ORAL at 14:48

## 2022-07-09 RX ADMIN — Medication 100 MILLIGRAM(S): at 06:36

## 2022-07-09 RX ADMIN — OXYCODONE HYDROCHLORIDE 10 MILLIGRAM(S): 5 TABLET ORAL at 04:17

## 2022-07-09 RX ADMIN — Medication 3: at 08:39

## 2022-07-09 RX ADMIN — Medication 3: at 17:05

## 2022-07-09 RX ADMIN — OXYCODONE HYDROCHLORIDE 10 MILLIGRAM(S): 5 TABLET ORAL at 03:17

## 2022-07-09 RX ADMIN — OXYCODONE HYDROCHLORIDE 5 MILLIGRAM(S): 5 TABLET ORAL at 07:36

## 2022-07-09 RX ADMIN — HYDROMORPHONE HYDROCHLORIDE 0.5 MILLIGRAM(S): 2 INJECTION INTRAMUSCULAR; INTRAVENOUS; SUBCUTANEOUS at 21:10

## 2022-07-09 RX ADMIN — HYDROMORPHONE HYDROCHLORIDE 0.5 MILLIGRAM(S): 2 INJECTION INTRAMUSCULAR; INTRAVENOUS; SUBCUTANEOUS at 08:00

## 2022-07-09 RX ADMIN — Medication 2: at 21:14

## 2022-07-09 RX ADMIN — CYCLOBENZAPRINE HYDROCHLORIDE 10 MILLIGRAM(S): 10 TABLET, FILM COATED ORAL at 06:36

## 2022-07-09 RX ADMIN — HYDROMORPHONE HYDROCHLORIDE 0.5 MILLIGRAM(S): 2 INJECTION INTRAMUSCULAR; INTRAVENOUS; SUBCUTANEOUS at 21:45

## 2022-07-09 RX ADMIN — PANTOPRAZOLE SODIUM 40 MILLIGRAM(S): 20 TABLET, DELAYED RELEASE ORAL at 06:53

## 2022-07-09 RX ADMIN — SENNA PLUS 2 TABLET(S): 8.6 TABLET ORAL at 21:10

## 2022-07-09 RX ADMIN — HYDROMORPHONE HYDROCHLORIDE 0.5 MILLIGRAM(S): 2 INJECTION INTRAMUSCULAR; INTRAVENOUS; SUBCUTANEOUS at 08:30

## 2022-07-09 RX ADMIN — CYCLOBENZAPRINE HYDROCHLORIDE 10 MILLIGRAM(S): 10 TABLET, FILM COATED ORAL at 14:37

## 2022-07-09 RX ADMIN — CYCLOBENZAPRINE HYDROCHLORIDE 10 MILLIGRAM(S): 10 TABLET, FILM COATED ORAL at 21:10

## 2022-07-09 RX ADMIN — OXYCODONE HYDROCHLORIDE 5 MILLIGRAM(S): 5 TABLET ORAL at 06:36

## 2022-07-09 RX ADMIN — OXYCODONE HYDROCHLORIDE 10 MILLIGRAM(S): 5 TABLET ORAL at 13:48

## 2022-07-09 NOTE — PHYSICAL THERAPY INITIAL EVALUATION ADULT - TRANSFER TRAINING, PT EVAL
Pt will independently perform sit to stand to sit transfers without LOB using rolling walker by 3-5 days.

## 2022-07-09 NOTE — OCCUPATIONAL THERAPY INITIAL EVALUATION ADULT - ADDITIONAL COMMENTS
Pt lives with spouse and mother in law (Who can assist when home) in a private house with 4 steps to enter with bilateral handrails. Once inside, the pt has 12-13 steps with a L handrail to reach the main floor where the bedroom and bathroom is. The pts bathroom has a tub/shower combination, regular toilet seat and no grab bars. The pt ambulates with no device and does not own any device for ambulation.

## 2022-07-09 NOTE — OCCUPATIONAL THERAPY INITIAL EVALUATION ADULT - PRECAUTIONS/LIMITATIONS, REHAB EVAL
Spinal precautions including no lifting greater then 8lbs, no twisting, and no bending. Patient verbalized understanding of these precautions./spinal precautions

## 2022-07-09 NOTE — CONSULT NOTE ADULT - SUBJECTIVE AND OBJECTIVE BOX
Patient is a 46y old  Female who presents with a chief complaint of L5-S1 HNP, x1 episode of bladder incontinence (09 Jul 2022 09:17)                                              CHIEF COMPLAINT: RLE Radicular Symptoms, weakness, x1 episode of bladder incontinence Saturday    HPI: Pt is a 46y s/p injury at work on May 6, 2022 who was sent in from the office by Dr. Tapia after reports loss of bladder function Saturday. Patient reports that she injured herself at work, and has had RLE radicular type symptoms, particularly burning shooting down her thigh to her toes. Pt was seen by Dr. Tapia as an outpatient, where imaging obtained demonstrated a L5-S1 HNP, causing compression on the spinal cord. In ED pt complaining of back pain. XR, CT, MR were deferred 2/2 to them being completed as an outpatient. Patient denies additional injury Negative head strike. Able to walk after incident, however has had an antalgic gait limiting her function and quality of life. Denies saddle anesthesia. Reports pain down right leg and RLE paresthesias, LLE denies pain down leg or paresthesias. Denies incontinence of bowel or bladder aside from episode aforementioned on Saturday. Patient denies CP/SOB/N/V. Patient is nervous regarding surgical intervention, which is appropriate. All questions answered to patient's satisfaction. Pt states she is in good health, follows with her doctors regularly. She takes Metformin 850 for Diabetes daily. No other medical issues. Patient interviewed and examined with  at bedside.      Author is called for medical management of post-operative elevated glucose.      PAST MEDICAL & SURGICAL HISTORY:  Polyp of corpus uteri  H/O dilation and curettage    FAMILY HISTORY:  Family history of diabetes mellitus (DM) (08 Jul 2022 00:13)    SUBJECTIVE & OBJECTIVE: Pt seen and examined at bedside.   PHYSICAL EXAM:  T(C): 36.8 (07-09-22 @ 17:06), Max: 36.8 (07-09-22 @ 17:06)  HR: 101 (07-09-22 @ 17:06) (79 - 106)  BP: 101/68 (07-09-22 @ 17:06) (101/68 - 126/83)  RR: 17 (07-09-22 @ 17:06) (17 - 18)  SpO2: 95% (07-09-22 @ 17:06) (92% - 98%) Daily     Daily I&O's Detail    08 Jul 2022 07:01  -  09 Jul 2022 07:00  --------------------------------------------------------  IN:    Lactated Ringers: 75 mL    Lactated Ringers: 75 mL  Total IN: 150 mL    OUT:    Voided (mL): 250 mL  Total OUT: 250 mL    Total NET: -100 mL      09 Jul 2022 07:01  -  09 Jul 2022 18:46  --------------------------------------------------------  IN:    Oral Fluid: 300 mL  Total IN: 300 mL    OUT:    Voided (mL): 300 mL  Total OUT: 300 mL    Total NET: 0 mL        GENERAL: NAD, well-groomed, well-developed  HEAD:  Atraumatic, Normocephalic  EYES: EOMI, PERRLA, conjunctiva and sclera clear  ENMT: Moist mucous membranes  NECK: Supple, No JVD  NERVOUS SYSTEM:  Alert & Oriented X3, moving both upper and lower extremities, denies pain  CHEST/LUNG: Clear to auscultation bilaterally; No rales, rhonchi, wheezing, or rubs  HEART: Regular rate and rhythm; No murmurs, rubs, or gallops  ABDOMEN: Soft, Nontender, Nondistended; Bowel sounds present  EXTREMITIES:  2+ Peripheral Pulses, No clubbing, cyanosis, or edema  LABS:                        12.2   13.78 )-----------( 260      ( 09 Jul 2022 07:15 )             37.1   PT/INR - ( 08 Jul 2022 08:40 )   PT: 11.6 sec;   INR: 0.98 ratio         PTT - ( 08 Jul 2022 08:40 )  PTT:34.7 secCAPILLARY BLOOD GLUCOSE      POCT Blood Glucose.: 285 mg/dL (09 Jul 2022 16:06)  POCT Blood Glucose.: 324 mg/dL (09 Jul 2022 10:41)  POCT Blood Glucose.: 406 mg/dL (09 Jul 2022 10:39)  POCT Blood Glucose.: 267 mg/dL (09 Jul 2022 08:12)  POCT Blood Glucose.: 257 mg/dL (08 Jul 2022 22:27)    RECENT CULTURES:   RADIOLOGY & ADDITIONAL TESTS:

## 2022-07-09 NOTE — CONSULT NOTE ADULT - PROBLEM SELECTOR RECOMMENDATION 9
- hemoglobin a1c is 12.1 per the patient her last hgba1c was 6.0  - this elevation is likely due to dietary indiscretion and steroids  - would achieve stricter glycemic control while inpatient.  Keep fingersticks 150's   - Start Lantus 8U tonight and 2 units premeal and titrate to goal of FSG<150  - Increase home medication Metformin to 1000 mg BID  - thank you for the courtesy of the consult.  We will follow along with you.

## 2022-07-09 NOTE — PROGRESS NOTE ADULT - SUBJECTIVE AND OBJECTIVE BOX
46F presented with cauda equina syndrome now s/p L5-S1 bilateral laminectomy and right L5-S1 discectomy POD1. Pt endorses back pain and nausea with episodes of vomiting yesterday. Denies chest pain, SOB, headache, numbness, tingling, weakness.    LABS:                        12.2   13.78 )-----------( 260      ( 09 Jul 2022 07:15 )             37.1     07-09    136  |  102  |  11  ----------------------------<  299<H>  4.1   |  26  |  0.86    Ca    8.9      09 Jul 2022 07:15    TPro  7.6  /  Alb  3.3  /  TBili  0.2  /  DBili  x   /  AST  16  /  ALT  26  /  AlkPhos  73  07-07    PT/INR - ( 08 Jul 2022 08:40 )   PT: 11.6 sec;   INR: 0.98 ratio         PTT - ( 08 Jul 2022 08:40 )  PTT:34.7 sec      VITAL SIGNS:  T(C): 36.7 (07-09-22 @ 06:40), Max: 36.9 (07-08-22 @ 13:59)  HR: 102 (07-09-22 @ 08:50) (73 - 102)  BP: 119/80 (07-09-22 @ 08:50) (109/69 - 137/89)  RR: 18 (07-09-22 @ 08:30) (10 - 21)  SpO2: 98% (07-09-22 @ 08:50) (92% - 98%)      PE  Gen: NAD, AAOX3  Lumbar dressing CDI    SPINE:  Skin intact  NTTP over the bony prominences of the cervical // thoracic // lumbar // sacral spine  No bony step-offs  Grossly moving all extremities  + Radial Pulse  + DP/PT Pulses    MOTOR EXAM:                          Hip Flex (L2)      Knee Ext (L3)      Ank Dorsiflex (L4)     Hallux Ext (L5)     Ank PlantarFlex (S1)  RIGHT               5/5                      5/5                          5/5                            5/5                           5/5  LEFT                  5/5                      5/5                          5/5                            5/5                           5/5      SENSORY EXAM:                      L2        L3       L4      L5       S1          RIGHT        2          2         2        2        2           (0=absent, 1=impaired, 2=normal, NT=not testable)  LEFT           2          2         2        2        2           (0=absent, 1=impaired, 2=normal, NT=not testable)    Negative Babinski bilaterally     A/P  46F presented with cauda equina syndrome now s/p L5-S1 bilateral laminectomy and right L5-S1 discectomy POD1. Stable.    - pain control  - DVT ppx: SCDs  - WBAT  - spine precautions (no bending, lifting, twisting)  - PT/OT  - ISS, carb control diet  - will discuss with Dr. Tapia and advise if any changes to plan are made

## 2022-07-09 NOTE — PHYSICAL THERAPY INITIAL EVALUATION ADULT - GENERAL OBSERVATIONS, REHAB EVAL
Pt was seen in supine c lumbar dressing C/D/I, alert and Ox4. Pt was medicated by RNSo  prior to PT session. Pt c/o light dixxiness from pain meds. Vital was monitored throughout P.T. intervention.  Pt was instructed spinal precaution prior to P.T. intervention and pt had verbal understanding and return demonstration.

## 2022-07-09 NOTE — OCCUPATIONAL THERAPY INITIAL EVALUATION ADULT - GENERAL OBSERVATIONS, REHAB EVAL
Pt was encountered supine in bed; NAD, s/p L5-S1 bilateral laminectomy and right L5-S1 discectomy POD1, lumbar dressing clean, dry and intact, spinal precautions, alert, verbalized name and , cooperative, followed commands; pt c/o pain in lumbar region which impacts pts ability to perform functional tasks/transfers and mobility.

## 2022-07-09 NOTE — PHYSICAL THERAPY INITIAL EVALUATION ADULT - PHYSICAL ASSIST/NONPHYSICAL ASSIST: SUPINE/SIT, REHAB EVAL
spinal precaution and proper body mechanics/verbal cues/nonverbal cues (demo/gestures)/1 person assist

## 2022-07-09 NOTE — PHYSICAL THERAPY INITIAL EVALUATION ADULT - GAIT DEVIATIONS NOTED, PT EVAL
decreased dayami/increased time in double stance/decreased velocity of limb motion/decreased step length/decreased stride length

## 2022-07-09 NOTE — OCCUPATIONAL THERAPY INITIAL EVALUATION ADULT - BED MOBILITY TRAINING, PT EVAL
Patient will be able to perform bed mobility tasks independently, using least restrictive device, within 2-4 weeks.

## 2022-07-09 NOTE — OCCUPATIONAL THERAPY INITIAL EVALUATION ADULT - RANGE OF MOTION EXAMINATION, LOWER EXTREMITY
Grossly./bilateral LE Active ROM was WFL  (within functional limits)/bilateral LE Passive ROM was WFL  (within functional limits)

## 2022-07-09 NOTE — OCCUPATIONAL THERAPY INITIAL EVALUATION ADULT - NS ASR OT EQUIP NEEDS DISCH
Recommending 3/1 commode and rolling walker for safe transfers and ADL management. Hip kit provided at bedside.

## 2022-07-09 NOTE — PHYSICAL THERAPY INITIAL EVALUATION ADULT - LIVES WITH, PROFILE
in a private house.  and mother in law will be available to assist pt in post -op care upon discharge home./spouse

## 2022-07-09 NOTE — PHYSICAL THERAPY INITIAL EVALUATION ADULT - GAIT TRAINING, PT EVAL
Pt will independently ambulate 200 feet with rolling walker and negotiate 13 steps c one rail, without loss of balance, by 2 weeks.

## 2022-07-09 NOTE — PHYSICAL THERAPY INITIAL EVALUATION ADULT - ADDITIONAL COMMENTS
There are 4 steps, c far martin rails, at the entry of the house and 13 steps, c L rail up, to negotiate at home.

## 2022-07-09 NOTE — PHYSICAL THERAPY INITIAL EVALUATION ADULT - ASSISTIVE DEVICE FOR TRANSFER: SIT/STAND, REHAB EVAL
- Continue taking the antibiotics until completed.   - Send me a [employer] message or phone message in 1 week to let me know how you're doing.   
rolling walker

## 2022-07-09 NOTE — OCCUPATIONAL THERAPY INITIAL EVALUATION ADULT - PERTINENT HX OF CURRENT PROBLEM, REHAB EVAL
47 y/o female admitted to [facility] ED s/p injury at work on May 6, 2022, who was sent in from the office by Dr. Tapia after reports loss of bladder function. As per orthopedic note, L5-S1 HNP, RLE Radiculopathy, with 1x episode of bladder incontinence, concern for cord compression.

## 2022-07-10 LAB
ANION GAP SERPL CALC-SCNC: 7 MMOL/L — SIGNIFICANT CHANGE UP (ref 5–17)
BASOPHILS # BLD AUTO: 0.02 K/UL — SIGNIFICANT CHANGE UP (ref 0–0.2)
BASOPHILS NFR BLD AUTO: 0.2 % — SIGNIFICANT CHANGE UP (ref 0–2)
BUN SERPL-MCNC: 9 MG/DL — SIGNIFICANT CHANGE UP (ref 7–23)
CALCIUM SERPL-MCNC: 8.8 MG/DL — SIGNIFICANT CHANGE UP (ref 8.5–10.1)
CHLORIDE SERPL-SCNC: 102 MMOL/L — SIGNIFICANT CHANGE UP (ref 96–108)
CO2 SERPL-SCNC: 27 MMOL/L — SIGNIFICANT CHANGE UP (ref 22–31)
CREAT SERPL-MCNC: 0.67 MG/DL — SIGNIFICANT CHANGE UP (ref 0.5–1.3)
EGFR: 110 ML/MIN/1.73M2 — SIGNIFICANT CHANGE UP
EOSINOPHIL # BLD AUTO: 0.01 K/UL — SIGNIFICANT CHANGE UP (ref 0–0.5)
EOSINOPHIL NFR BLD AUTO: 0.1 % — SIGNIFICANT CHANGE UP (ref 0–6)
GLUCOSE BLDC GLUCOMTR-MCNC: 201 MG/DL — HIGH (ref 70–99)
GLUCOSE BLDC GLUCOMTR-MCNC: 223 MG/DL — HIGH (ref 70–99)
GLUCOSE BLDC GLUCOMTR-MCNC: 252 MG/DL — HIGH (ref 70–99)
GLUCOSE BLDC GLUCOMTR-MCNC: 260 MG/DL — HIGH (ref 70–99)
GLUCOSE SERPL-MCNC: 208 MG/DL — HIGH (ref 70–99)
HCT VFR BLD CALC: 34.3 % — LOW (ref 34.5–45)
HGB BLD-MCNC: 11.5 G/DL — SIGNIFICANT CHANGE UP (ref 11.5–15.5)
IMM GRANULOCYTES NFR BLD AUTO: 0.3 % — SIGNIFICANT CHANGE UP (ref 0–1.5)
LYMPHOCYTES # BLD AUTO: 1.98 K/UL — SIGNIFICANT CHANGE UP (ref 1–3.3)
LYMPHOCYTES # BLD AUTO: 18.2 % — SIGNIFICANT CHANGE UP (ref 13–44)
MCHC RBC-ENTMCNC: 29.9 PG — SIGNIFICANT CHANGE UP (ref 27–34)
MCHC RBC-ENTMCNC: 33.5 G/DL — SIGNIFICANT CHANGE UP (ref 32–36)
MCV RBC AUTO: 89.1 FL — SIGNIFICANT CHANGE UP (ref 80–100)
MONOCYTES # BLD AUTO: 1.01 K/UL — HIGH (ref 0–0.9)
MONOCYTES NFR BLD AUTO: 9.3 % — SIGNIFICANT CHANGE UP (ref 2–14)
NEUTROPHILS # BLD AUTO: 7.85 K/UL — HIGH (ref 1.8–7.4)
NEUTROPHILS NFR BLD AUTO: 71.9 % — SIGNIFICANT CHANGE UP (ref 43–77)
NRBC # BLD: 0 /100 WBCS — SIGNIFICANT CHANGE UP (ref 0–0)
PLATELET # BLD AUTO: 251 K/UL — SIGNIFICANT CHANGE UP (ref 150–400)
POTASSIUM SERPL-MCNC: 3 MMOL/L — LOW (ref 3.5–5.3)
POTASSIUM SERPL-SCNC: 3 MMOL/L — LOW (ref 3.5–5.3)
RBC # BLD: 3.85 M/UL — SIGNIFICANT CHANGE UP (ref 3.8–5.2)
RBC # FLD: 13.5 % — SIGNIFICANT CHANGE UP (ref 10.3–14.5)
SODIUM SERPL-SCNC: 136 MMOL/L — SIGNIFICANT CHANGE UP (ref 135–145)
WBC # BLD: 10.9 K/UL — HIGH (ref 3.8–10.5)
WBC # FLD AUTO: 10.9 K/UL — HIGH (ref 3.8–10.5)

## 2022-07-10 PROCEDURE — 99253 IP/OBS CNSLTJ NEW/EST LOW 45: CPT

## 2022-07-10 RX ORDER — POTASSIUM CHLORIDE 20 MEQ
40 PACKET (EA) ORAL EVERY 4 HOURS
Refills: 0 | Status: COMPLETED | OUTPATIENT
Start: 2022-07-10 | End: 2022-07-10

## 2022-07-10 RX ADMIN — CYCLOBENZAPRINE HYDROCHLORIDE 10 MILLIGRAM(S): 10 TABLET, FILM COATED ORAL at 21:36

## 2022-07-10 RX ADMIN — CYCLOBENZAPRINE HYDROCHLORIDE 10 MILLIGRAM(S): 10 TABLET, FILM COATED ORAL at 05:03

## 2022-07-10 RX ADMIN — METFORMIN HYDROCHLORIDE 1000 MILLIGRAM(S): 850 TABLET ORAL at 05:03

## 2022-07-10 RX ADMIN — OXYCODONE HYDROCHLORIDE 10 MILLIGRAM(S): 5 TABLET ORAL at 11:39

## 2022-07-10 RX ADMIN — INSULIN GLARGINE 6 UNIT(S): 100 INJECTION, SOLUTION SUBCUTANEOUS at 21:37

## 2022-07-10 RX ADMIN — Medication 40 MILLIEQUIVALENT(S): at 11:35

## 2022-07-10 RX ADMIN — OXYCODONE HYDROCHLORIDE 10 MILLIGRAM(S): 5 TABLET ORAL at 12:15

## 2022-07-10 RX ADMIN — PANTOPRAZOLE SODIUM 40 MILLIGRAM(S): 20 TABLET, DELAYED RELEASE ORAL at 08:06

## 2022-07-10 RX ADMIN — Medication 40 MILLIEQUIVALENT(S): at 16:46

## 2022-07-10 RX ADMIN — Medication 4: at 08:05

## 2022-07-10 RX ADMIN — Medication 2 UNIT(S): at 16:46

## 2022-07-10 RX ADMIN — HYDROMORPHONE HYDROCHLORIDE 0.5 MILLIGRAM(S): 2 INJECTION INTRAMUSCULAR; INTRAVENOUS; SUBCUTANEOUS at 03:17

## 2022-07-10 RX ADMIN — Medication 6: at 16:46

## 2022-07-10 RX ADMIN — SENNA PLUS 2 TABLET(S): 8.6 TABLET ORAL at 21:36

## 2022-07-10 RX ADMIN — Medication 2 UNIT(S): at 11:36

## 2022-07-10 RX ADMIN — METFORMIN HYDROCHLORIDE 1000 MILLIGRAM(S): 850 TABLET ORAL at 18:24

## 2022-07-10 RX ADMIN — HYDROMORPHONE HYDROCHLORIDE 0.5 MILLIGRAM(S): 2 INJECTION INTRAMUSCULAR; INTRAVENOUS; SUBCUTANEOUS at 03:55

## 2022-07-10 RX ADMIN — Medication 1: at 21:36

## 2022-07-10 RX ADMIN — CYCLOBENZAPRINE HYDROCHLORIDE 10 MILLIGRAM(S): 10 TABLET, FILM COATED ORAL at 13:50

## 2022-07-10 RX ADMIN — Medication 4: at 11:35

## 2022-07-10 RX ADMIN — Medication 2 UNIT(S): at 08:06

## 2022-07-10 NOTE — PROGRESS NOTE ADULT - SUBJECTIVE AND OBJECTIVE BOX
Orthopedics  POD 2 s/p L5/S1 Lami/Discectomy    Patient seen and examined at bedside. Reporting Pain but overall controlled with medications. Otherwise feeling well. No cp sob nausea or vomiting.    Vital Signs Last 24 Hrs  T(C): 37.2 (07-10-22 @ 05:10), Max: 37.4 (07-09-22 @ 23:19)  T(F): 98.9 (07-10-22 @ 05:10), Max: 99.3 (07-09-22 @ 23:19)  HR: 96 (07-10-22 @ 05:10) (84 - 106)  BP: 108/70 (07-10-22 @ 05:10) (101/68 - 119/80)  BP(mean): --  RR: 18 (07-10-22 @ 05:10) (17 - 18)  SpO2: 97% (07-10-22 @ 05:10) (92% - 98%)                        12.2   13.78 )-----------( 260      ( 09 Jul 2022 07:15 )             37.1     09 Jul 2022 07:15    136    |  102    |  11     ----------------------------<  299    4.1     |  26     |  0.86     Ca    8.9        09 Jul 2022 07:15      PT/INR - ( 08 Jul 2022 08:40 )   PT: 11.6 sec;   INR: 0.98 ratio         PTT - ( 08 Jul 2022 08:40 )  PTT:34.7 sec    Exam:  Gen: NAD, resting comfortably  Dressing c/d/i    Motor:                   C5                C6              C7               C8           T1   R            5/5                5/5            5/5             5/5          5/5  L             5/5               5/5             5/5             5/5          5/5                L2             L3             L4               L5            S1  R         4/5           5/5          5/5             5/5           5/5  L          4/5          5/5           5/5             5/5           5/5    Sensory:            C5         C6         C7      C8       T1        (0=absent, 1=impaired, 2=normal, NT=not testable)  R         2            2           2        2         2  L          2            2           2        2         2               L2          L3         L4      L5       S1         (0=absent, 1=impaired, 2=normal, NT=not testable)  R         2            2            2        2        2  L          2            2           2        2         2    Negative Gordon, Negative Clonus, Negative Babinski    +DP/PT 2+  Calf NTTP b/l  Compartments soft and compressible    A/P:  46yFemale Stable POD 2  s/p L5/S1 Lami/Discectomy    Medical comanagement appreciated  -FU AM labs  -WBAT  -Pain control PRN  -PT/OT  -Post void residuals obtained yesterday within normal limits, no retention  -DVT PE ppx: SCDs only postop spine surgery  -Incentive spirometry  -Dispo: Home per PT recs, pending social work for equipment  -Will discuss with attending Dr. Tapia and advise if any changes to plan

## 2022-07-10 NOTE — PROGRESS NOTE ADULT - ASSESSMENT
47 y/o female with    Problem/Recommendation - 1:  ·  Problem: Diabetes mellitus with hyperglycemia.   ·  Recommendation: - hemoglobin a1c is 12.1 per the patient her last hgba1c was 6.0  - this elevation is likely due to dietary indiscretion and steroids  - would achieve stricter glycemic control while inpatient.  Keep fingersticks 150's   - Start Lantus 8U tonight and 2 units premeal and titrate to goal of FSG<150  - Increase home medication Metformin to 1000 mg BID  - thank you for the courtesy of the consult.  We will follow along with you.    Problem/Recommendation - 2:  ·  Problem: Suspected cauda equina syndrome.   ·  Recommendation: - plan per primary team. 47 y/o female with    Problem/Recommendation - 1:  ·  Problem: Diabetes mellitus with hyperglycemia.   ·  Recommendation: - hemoglobin a1c is 12.1 per the patient her last hgba1c was 6.0  - this elevation is likely due to dietary indiscretion and steroids  - would achieve stricter glycemic control while inpatient.  Keep fingersticks 150's   - Start Lantus 8U tonight and 2 units premeal and titrate to goal of FSG<150  - Increase home medication Metformin to 1000 mg BID  - 7/10/2020 - fingersticks are improving, fingersticks are acceptable for d/c     Problem/Recommendation - 2:  ·  Problem: Suspected cauda equina syndrome.   ·  Recommendation: - plan per primary team.

## 2022-07-10 NOTE — PROGRESS NOTE ADULT - SUBJECTIVE AND OBJECTIVE BOX
Patient is a 46y old  Female who presents with a chief complaint of L5-S1 HNP, x1 episode of bladder incontinence (10 Jul 2022 08:28)    HPI:  Orthopaedic Spine                                                 CHIEF COMPLAINT: RLE Radicular Symptoms, weakness, x1 episode of bladder incontinence Saturday    HPI: Pt is a 46y s/p injury at work on May 6, 2022 who was sent in from the office by Dr. Tapia after reports loss of bladder function Saturday. Patient reports that she injured herself at work, and has had RLE radicular type symptoms, particularly burning shooting down her thigh to her toes. Pt was seen by Dr. Tapia as an outpatient, where imaging obtained demonstrated a L5-S1 HNP, causing compression on the spinal cord. In ED pt complaining of back pain. XR, CT, MR were deferred 2/2 to them being completed as an outpatient. Patient denies additional injury Negative head strike. Able to walk after incident, however has had an antalgic gait limiting her function and quality of life. Denies saddle anesthesia. Reports pain down right leg and RLE paresthesias, LLE denies pain down leg or paresthesias. Denies incontinence of bowel or bladder aside from episode aforementioned on Saturday. Patient denies CP/SOB/N/V. Patient is nervous regarding surgical intervention, which is appropriate. All questions answered to patient's satisfaction.     Pt states she is in good health, follows with her doctors regularly. She takes Metformin 850 for Diabetes daily. No other medical issues. Patient interviewed and examined with  at bedside.     PAST MEDICAL & SURGICAL HISTORY:  Polyp of corpus uteri      H/O dilation and curettage    FAMILY HISTORY:  Family history of diabetes mellitus (DM) (08 Jul 2022 00:13)    SUBJECTIVE & OBJECTIVE: Pt seen and examined at bedside.   PHYSICAL EXAM:  T(C): 37.2 (07-10-22 @ 05:10), Max: 37.4 (07-09-22 @ 23:19)  HR: 96 (07-10-22 @ 05:10) (95 - 106)  BP: 108/70 (07-10-22 @ 05:10) (101/68 - 112/74)  RR: 18 (07-10-22 @ 05:10) (17 - 18)  SpO2: 97% (07-10-22 @ 05:10) (95% - 97%) Daily     Daily I&O's Detail    09 Jul 2022 07:01  -  10 Jul 2022 07:00  --------------------------------------------------------  IN:    Oral Fluid: 300 mL  Total IN: 300 mL    OUT:    Voided (mL): 300 mL  Total OUT: 300 mL    Total NET: 0 mL        GENERAL: NAD, well-groomed, well-developed  HEAD:  Atraumatic, Normocephalic  EYES: EOMI, PERRLA, conjunctiva and sclera clear  ENMT: Moist mucous membranes  NECK: Supple, No JVD  NERVOUS SYSTEM:  Alert & Oriented X3, Motor Strength 5/5 B/L upper and lower extremities; DTRs 2+ intact and symmetric  CHEST/LUNG: Clear to auscultation bilaterally; No rales, rhonchi, wheezing, or rubs  HEART: Regular rate and rhythm; No murmurs, rubs, or gallops  ABDOMEN: Soft, Nontender, Nondistended; Bowel sounds present  EXTREMITIES:  2+ Peripheral Pulses, No clubbing, cyanosis, or edema  LABS:                        11.5   10.90 )-----------( 251      ( 10 Jul 2022 08:38 )             34.3   CAPILLARY BLOOD GLUCOSE      POCT Blood Glucose.: 223 mg/dL (10 Jul 2022 07:48)  POCT Blood Glucose.: 318 mg/dL (09 Jul 2022 21:11)  POCT Blood Glucose.: 285 mg/dL (09 Jul 2022 16:06)    RECENT CULTURES:   RADIOLOGY & ADDITIONAL TESTS:

## 2022-07-11 ENCOUNTER — TRANSCRIPTION ENCOUNTER (OUTPATIENT)
Age: 47
End: 2022-07-11

## 2022-07-11 LAB
ANION GAP SERPL CALC-SCNC: 10 MMOL/L — SIGNIFICANT CHANGE UP (ref 5–17)
BASOPHILS # BLD AUTO: 0.02 K/UL — SIGNIFICANT CHANGE UP (ref 0–0.2)
BASOPHILS NFR BLD AUTO: 0.2 % — SIGNIFICANT CHANGE UP (ref 0–2)
BUN SERPL-MCNC: 14 MG/DL — SIGNIFICANT CHANGE UP (ref 7–23)
CALCIUM SERPL-MCNC: 9.1 MG/DL — SIGNIFICANT CHANGE UP (ref 8.5–10.1)
CHLORIDE SERPL-SCNC: 105 MMOL/L — SIGNIFICANT CHANGE UP (ref 96–108)
CO2 SERPL-SCNC: 24 MMOL/L — SIGNIFICANT CHANGE UP (ref 22–31)
CREAT SERPL-MCNC: 0.79 MG/DL — SIGNIFICANT CHANGE UP (ref 0.5–1.3)
EGFR: 94 ML/MIN/1.73M2 — SIGNIFICANT CHANGE UP
EOSINOPHIL # BLD AUTO: 0.01 K/UL — SIGNIFICANT CHANGE UP (ref 0–0.5)
EOSINOPHIL NFR BLD AUTO: 0.1 % — SIGNIFICANT CHANGE UP (ref 0–6)
GLUCOSE BLDC GLUCOMTR-MCNC: 224 MG/DL — HIGH (ref 70–99)
GLUCOSE BLDC GLUCOMTR-MCNC: 233 MG/DL — HIGH (ref 70–99)
GLUCOSE BLDC GLUCOMTR-MCNC: 251 MG/DL — HIGH (ref 70–99)
GLUCOSE BLDC GLUCOMTR-MCNC: 252 MG/DL — HIGH (ref 70–99)
GLUCOSE SERPL-MCNC: 272 MG/DL — HIGH (ref 70–99)
HCT VFR BLD CALC: 33.8 % — LOW (ref 34.5–45)
HGB BLD-MCNC: 11 G/DL — LOW (ref 11.5–15.5)
IMM GRANULOCYTES NFR BLD AUTO: 0.6 % — SIGNIFICANT CHANGE UP (ref 0–1.5)
LYMPHOCYTES # BLD AUTO: 2.09 K/UL — SIGNIFICANT CHANGE UP (ref 1–3.3)
LYMPHOCYTES # BLD AUTO: 20 % — SIGNIFICANT CHANGE UP (ref 13–44)
MCHC RBC-ENTMCNC: 29.8 PG — SIGNIFICANT CHANGE UP (ref 27–34)
MCHC RBC-ENTMCNC: 32.5 G/DL — SIGNIFICANT CHANGE UP (ref 32–36)
MCV RBC AUTO: 91.6 FL — SIGNIFICANT CHANGE UP (ref 80–100)
MONOCYTES # BLD AUTO: 1.06 K/UL — HIGH (ref 0–0.9)
MONOCYTES NFR BLD AUTO: 10.1 % — SIGNIFICANT CHANGE UP (ref 2–14)
NEUTROPHILS # BLD AUTO: 7.21 K/UL — SIGNIFICANT CHANGE UP (ref 1.8–7.4)
NEUTROPHILS NFR BLD AUTO: 69 % — SIGNIFICANT CHANGE UP (ref 43–77)
NRBC # BLD: 0 /100 WBCS — SIGNIFICANT CHANGE UP (ref 0–0)
PLATELET # BLD AUTO: 232 K/UL — SIGNIFICANT CHANGE UP (ref 150–400)
POTASSIUM SERPL-MCNC: 4 MMOL/L — SIGNIFICANT CHANGE UP (ref 3.5–5.3)
POTASSIUM SERPL-SCNC: 4 MMOL/L — SIGNIFICANT CHANGE UP (ref 3.5–5.3)
RBC # BLD: 3.69 M/UL — LOW (ref 3.8–5.2)
RBC # FLD: 13.5 % — SIGNIFICANT CHANGE UP (ref 10.3–14.5)
SODIUM SERPL-SCNC: 139 MMOL/L — SIGNIFICANT CHANGE UP (ref 135–145)
SURGICAL PATHOLOGY STUDY: SIGNIFICANT CHANGE UP
WBC # BLD: 10.45 K/UL — SIGNIFICANT CHANGE UP (ref 3.8–10.5)
WBC # FLD AUTO: 10.45 K/UL — SIGNIFICANT CHANGE UP (ref 3.8–10.5)

## 2022-07-11 PROCEDURE — 99232 SBSQ HOSP IP/OBS MODERATE 35: CPT

## 2022-07-11 RX ORDER — OXYCODONE HYDROCHLORIDE 5 MG/1
10 TABLET ORAL EVERY 4 HOURS
Refills: 0 | Status: DISCONTINUED | OUTPATIENT
Start: 2022-07-11 | End: 2022-07-12

## 2022-07-11 RX ADMIN — Medication 2 UNIT(S): at 08:01

## 2022-07-11 RX ADMIN — Medication 4: at 11:44

## 2022-07-11 RX ADMIN — HYDROMORPHONE HYDROCHLORIDE 0.5 MILLIGRAM(S): 2 INJECTION INTRAMUSCULAR; INTRAVENOUS; SUBCUTANEOUS at 02:27

## 2022-07-11 RX ADMIN — OXYCODONE HYDROCHLORIDE 10 MILLIGRAM(S): 5 TABLET ORAL at 21:34

## 2022-07-11 RX ADMIN — Medication 6: at 16:41

## 2022-07-11 RX ADMIN — OXYCODONE HYDROCHLORIDE 10 MILLIGRAM(S): 5 TABLET ORAL at 20:34

## 2022-07-11 RX ADMIN — Medication 2 UNIT(S): at 16:41

## 2022-07-11 RX ADMIN — Medication 4: at 08:00

## 2022-07-11 RX ADMIN — OXYCODONE HYDROCHLORIDE 10 MILLIGRAM(S): 5 TABLET ORAL at 11:49

## 2022-07-11 RX ADMIN — HYDROMORPHONE HYDROCHLORIDE 0.5 MILLIGRAM(S): 2 INJECTION INTRAMUSCULAR; INTRAVENOUS; SUBCUTANEOUS at 02:55

## 2022-07-11 RX ADMIN — OXYCODONE HYDROCHLORIDE 10 MILLIGRAM(S): 5 TABLET ORAL at 12:49

## 2022-07-11 RX ADMIN — CYCLOBENZAPRINE HYDROCHLORIDE 10 MILLIGRAM(S): 10 TABLET, FILM COATED ORAL at 05:02

## 2022-07-11 RX ADMIN — METFORMIN HYDROCHLORIDE 1000 MILLIGRAM(S): 850 TABLET ORAL at 17:20

## 2022-07-11 RX ADMIN — PANTOPRAZOLE SODIUM 40 MILLIGRAM(S): 20 TABLET, DELAYED RELEASE ORAL at 07:58

## 2022-07-11 RX ADMIN — Medication 2 UNIT(S): at 11:44

## 2022-07-11 RX ADMIN — CYCLOBENZAPRINE HYDROCHLORIDE 10 MILLIGRAM(S): 10 TABLET, FILM COATED ORAL at 13:28

## 2022-07-11 RX ADMIN — METFORMIN HYDROCHLORIDE 1000 MILLIGRAM(S): 850 TABLET ORAL at 05:02

## 2022-07-11 NOTE — DIETITIAN INITIAL EVALUATION ADULT - PERTINENT MEDS FT
MEDICATIONS  (STANDING):  cyclobenzaprine 10 milliGRAM(s) Oral every 8 hours  dextrose 5%. 1000 milliLiter(s) (50 mL/Hr) IV Continuous <Continuous>  dextrose 5%. 1000 milliLiter(s) (100 mL/Hr) IV Continuous <Continuous>  dextrose 50% Injectable 25 Gram(s) IV Push once  dextrose 50% Injectable 12.5 Gram(s) IV Push once  dextrose 50% Injectable 25 Gram(s) IV Push once  glucagon  Injectable 1 milliGRAM(s) IntraMuscular once  insulin glargine Injectable (LANTUS) 6 Unit(s) SubCutaneous at bedtime  insulin lispro (ADMELOG) corrective regimen sliding scale   SubCutaneous three times a day before meals  insulin lispro (ADMELOG) corrective regimen sliding scale   SubCutaneous at bedtime  insulin lispro Injectable (ADMELOG) 2 Unit(s) SubCutaneous three times a day before meals  metFORMIN 1000 milliGRAM(s) Oral every 12 hours  pantoprazole    Tablet 40 milliGRAM(s) Oral before breakfast  senna 2 Tablet(s) Oral at bedtime    MEDICATIONS  (PRN):  acetaminophen     Tablet .. 650 milliGRAM(s) Oral every 6 hours PRN Temp greater or equal to 38C (100.4F), Mild Pain (1 - 3)  bisacodyl 5 milliGRAM(s) Oral every 12 hours PRN Constipation  dextrose Oral Gel 15 Gram(s) Oral once PRN Blood Glucose LESS THAN 70 milliGRAM(s)/deciliter  HYDROmorphone  Injectable 0.5 milliGRAM(s) IV Push every 4 hours PRN breakthrough pain  magnesium hydroxide Suspension 30 milliLiter(s) Oral every 12 hours PRN Constipation  ondansetron   Disintegrating Tablet 4 milliGRAM(s) Oral every 6 hours PRN Nausea and/or Vomiting  oxyCODONE    IR 5 milliGRAM(s) Oral every 6 hours PRN Moderate Pain (4 - 6)  oxyCODONE    IR 10 milliGRAM(s) Oral every 4 hours PRN Severe Pain (7 - 10)  traMADol 50 milliGRAM(s) Oral every 6 hours PRN Mild Pain (1 - 3)

## 2022-07-11 NOTE — PROGRESS NOTE ADULT - SUBJECTIVE AND OBJECTIVE BOX
Orthopedics  POD 3 s/p L5/S1 Lami/Discectomy    Patient seen and examined at bedside. Reporting back Pain but overall controlled with medications. Otherwise feeling well. No cp sob nausea or vomiting. Able to ambulate with RW.    Vital Signs Last 24 Hrs  T(C): 37.2 (07-10-22 @ 05:10), Max: 37.4 (07-09-22 @ 23:19)  T(F): 98.9 (07-10-22 @ 05:10), Max: 99.3 (07-09-22 @ 23:19)  HR: 96 (07-10-22 @ 05:10) (84 - 106)  BP: 108/70 (07-10-22 @ 05:10) (101/68 - 119/80)  BP(mean): --  RR: 18 (07-10-22 @ 05:10) (17 - 18)  SpO2: 97% (07-10-22 @ 05:10) (92% - 98%)                        12.2   13.78 )-----------( 260      ( 09 Jul 2022 07:15 )             37.1     09 Jul 2022 07:15    136    |  102    |  11     ----------------------------<  299    4.1     |  26     |  0.86     Ca    8.9        09 Jul 2022 07:15      PT/INR - ( 08 Jul 2022 08:40 )   PT: 11.6 sec;   INR: 0.98 ratio         PTT - ( 08 Jul 2022 08:40 )  PTT:34.7 sec    Exam:  Gen: NAD, resting comfortably  Dressing c/d/i    Motor:                   C5                C6              C7               C8           T1   R            5/5                5/5            5/5             5/5          5/5  L             5/5               5/5             5/5             5/5          5/5                L2             L3             L4               L5            S1  R         4/5           5/5          5/5             5/5           5/5  L          4/5          5/5           5/5             5/5           5/5    Sensory:            C5         C6         C7      C8       T1        (0=absent, 1=impaired, 2=normal, NT=not testable)  R         2            2           2        2         2  L          2            2           2        2         2               L2          L3         L4      L5       S1         (0=absent, 1=impaired, 2=normal, NT=not testable)  R         2            2            2        2        2  L          2            2           2        2         2    Negative Gordon, Negative Clonus, Negative Babinski    +DP/PT 2+  Calf NTTP b/l  Compartments soft and compressible    A/P:  46yFemale Stable POD 3  s/p L5/S1 Lami/Discectomy    Medical comanagement appreciated  -FU AM labs  -WBAT  -Pain control PRN  -PT/OT  -Post void residuals obtained within normal limits, no retention  -DVT PE ppx: SCDs only postop spine surgery  -Incentive spirometry  -Dispo: Home per PT recs, pending social work for equipment  -Will discuss with attending Dr. Tapia and advise if any changes to plan

## 2022-07-11 NOTE — PROGRESS NOTE ADULT - ASSESSMENT
45 y/o female with    Problem/Recommendation - 1:  ·  Problem: Diabetes mellitus with hyperglycemia.   ·  Recommendation: - hemoglobin a1c is 12.1 per the patient her last hgba1c was 6.0  - this elevation is likely due to dietary indiscretion and steroids  - would achieve stricter glycemic control while inpatient.  Keep fingersticks 150's   - Increase lantus to 12 tonight and 3units premeal and titrate to goal of FSG<150  - Increase home medication Metformin to 1000 mg BID  - 7/10/2020 - fingersticks are improving, fingersticks are acceptable for d/c     Problem/Recommendation - 2:  ·  Problem: Suspected cauda equina syndrome.   ·  Recommendation: - plan per primary team.

## 2022-07-11 NOTE — DIETITIAN INITIAL EVALUATION ADULT - OTHER CALCULATIONS
Ht (cm): 167.6cm  Wt (kg): 87.7kg (07/08 dosing weight)  BMI:31.2    IBW: 58.9kg +/- 10% %IBW: 148%  UBW:  %UBW:  Ht (cm): 167.6cm  Wt (kg): 87.7kg (07/08 dosing weight)  BMI:31.2    IBW: 58.9kg +/- 10% %IBW: 148%  UBW: 72.7kg %UBW: 121%

## 2022-07-11 NOTE — DIETITIAN INITIAL EVALUATION ADULT - PERTINENT LABORATORY DATA
07-11    139  |  105  |  14  ----------------------------<  272<H>  4.0   |  24  |  0.79    Ca    9.1      11 Jul 2022 06:10    POCT Blood Glucose.: 224 mg/dL (07-11-22 @ 11:18)  A1C with Estimated Average Glucose Result: 12.1 % (07-08-22 @ 08:40)

## 2022-07-11 NOTE — PROGRESS NOTE ADULT - SUBJECTIVE AND OBJECTIVE BOX
Patient is a 46y old  Female who presents with a chief complaint of L5-S1 HNP, x1 episode of bladder incontinence (12 Jul 2022 05:40)    HPI:  Orthopaedic Spine                                                 CHIEF COMPLAINT: RLE Radicular Symptoms, weakness, x1 episode of bladder incontinence Saturday    HPI: Pt is a 46y s/p injury at work on May 6, 2022 who was sent in from the office by Dr. Tapia after reports loss of bladder function Saturday. Patient reports that she injured herself at work, and has had RLE radicular type symptoms, particularly burning shooting down her thigh to her toes. Pt was seen by Dr. Tapia as an outpatient, where imaging obtained demonstrated a L5-S1 HNP, causing compression on the spinal cord. In ED pt complaining of back pain. XR, CT, MR were deferred 2/2 to them being completed as an outpatient. Patient denies additional injury Negative head strike. Able to walk after incident, however has had an antalgic gait limiting her function and quality of life. Denies saddle anesthesia. Reports pain down right leg and RLE paresthesias, LLE denies pain down leg or paresthesias. Denies incontinence of bowel or bladder aside from episode aforementioned on Saturday. Patient denies CP/SOB/N/V. Patient is nervous regarding surgical intervention, which is appropriate. All questions answered to patient's satisfaction.     Pt states she is in good health, follows with her doctors regularly. She takes Metformin 850 for Diabetes daily. No other medical issues. Patient interviewed and examined with  at bedside.     PAST MEDICAL & SURGICAL HISTORY:  Polyp of corpus uteri      H/O dilation and curettage    FAMILY HISTORY:  Family history of diabetes mellitus (DM) (08 Jul 2022 00:13)    SUBJECTIVE & OBJECTIVE: Pt seen and examined at bedside.   PHYSICAL EXAM:  T(C): 37 (07-12-22 @ 11:00), Max: 37.3 (07-12-22 @ 00:01)  HR: 100 (07-12-22 @ 11:00) (94 - 119)  BP: 115/77 (07-12-22 @ 11:00) (104/71 - 125/83)  RR: 17 (07-12-22 @ 11:00) (17 - 18)  SpO2: 95% (07-12-22 @ 11:00) (94% - 95%) Daily     Daily I&O's Detail    11 Jul 2022 07:01  -  12 Jul 2022 07:00  --------------------------------------------------------  IN:    Oral Fluid: 560 mL  Total IN: 560 mL    OUT:  Total OUT: 0 mL    Total NET: 560 mL      12 Jul 2022 07:01  -  12 Jul 2022 12:40  --------------------------------------------------------  IN:    Oral Fluid: 360 mL  Total IN: 360 mL    OUT:  Total OUT: 0 mL    Total NET: 360 mL        GENERAL: NAD, well-groomed, well-developed  HEAD:  Atraumatic, Normocephalic  EYES: EOMI, PERRLA, conjunctiva and sclera clear  ENMT: Moist mucous membranes  NECK: Supple, No JVD  NERVOUS SYSTEM:  Alert & Oriented X3, Motor Strength 5/5 B/L upper and lower extremities; DTRs 2+ intact and symmetric  CHEST/LUNG: Clear to auscultation bilaterally; No rales, rhonchi, wheezing, or rubs  HEART: Regular rate and rhythm; No murmurs, rubs, or gallops  ABDOMEN: Soft, Nontender, Nondistended; Bowel sounds present  EXTREMITIES:  2+ Peripheral Pulses, No clubbing, cyanosis, or edema  LABS:                        11.0   10.45 )-----------( 232      ( 11 Jul 2022 06:10 )             33.8   CAPILLARY BLOOD GLUCOSE      POCT Blood Glucose.: 278 mg/dL (12 Jul 2022 11:01)  POCT Blood Glucose.: 211 mg/dL (12 Jul 2022 07:46)  POCT Blood Glucose.: 252 mg/dL (11 Jul 2022 21:05)  POCT Blood Glucose.: 251 mg/dL (11 Jul 2022 15:41)    RECENT CULTURES:   RADIOLOGY & ADDITIONAL TESTS:

## 2022-07-11 NOTE — DIETITIAN INITIAL EVALUATION ADULT - OTHER INFO
Pt with T2DM; Metformin (850mg) PTA per H&P. HbA1c 12.1% indicates poor blood glucose management.  Pt reports good appetite and PO intake PTA. Reports she follows low sugar diet and avoids consuming rice at home; states she consumes 2 meals/day PTA.     Pt reports fair appetite and PO intake during LOS due to pain- refuses nutritional supplement at this time. Denies difficulty chewing/swallowing. Pt reports weight stable; states  pounds. Dosing weight this admission is 193.3 pounds. Weight gain as noted below.     Pt with T2DM; Metformin (850mg) PTA per H&P. Pt reports checking blood glucose levels daily at home with levels in the 100s. HbA1c 12.1% indicates poor blood glucose management.     Discussed with pt foods containing carbohydrates with portion sizes, pairing carbohydrates with proteins (with examples), consistent meal patterns, and portion control. Pt refused written education at this time. Made aware RD remains available.

## 2022-07-11 NOTE — DISCHARGE NOTE NURSING/CASE MANAGEMENT/SOCIAL WORK - PATIENT PORTAL LINK FT
You can access the FollowMyHealth Patient Portal offered by [facility] by registering at the following website: [email or web address]. By joining [facility]’s FollowMyHealth portal, you will also be able to view your health information using other applications (apps) compatible with our system.

## 2022-07-12 VITALS
SYSTOLIC BLOOD PRESSURE: 120 MMHG | OXYGEN SATURATION: 98 % | TEMPERATURE: 98 F | DIASTOLIC BLOOD PRESSURE: 70 MMHG | HEART RATE: 98 BPM | RESPIRATION RATE: 18 BRPM

## 2022-07-12 LAB
GLUCOSE BLDC GLUCOMTR-MCNC: 211 MG/DL — HIGH (ref 70–99)
GLUCOSE BLDC GLUCOMTR-MCNC: 250 MG/DL — HIGH (ref 70–99)
GLUCOSE BLDC GLUCOMTR-MCNC: 278 MG/DL — HIGH (ref 70–99)

## 2022-07-12 PROCEDURE — 99232 SBSQ HOSP IP/OBS MODERATE 35: CPT

## 2022-07-12 RX ORDER — OXYCODONE HYDROCHLORIDE 5 MG/1
1 TABLET ORAL
Qty: 30 | Refills: 0
Start: 2022-07-12 | End: 2022-07-16

## 2022-07-12 RX ORDER — ACETAMINOPHEN 500 MG
2 TABLET ORAL
Qty: 60 | Refills: 0
Start: 2022-07-12 | End: 2022-07-16

## 2022-07-12 RX ORDER — INSULIN GLARGINE 100 [IU]/ML
12 INJECTION, SOLUTION SUBCUTANEOUS AT BEDTIME
Refills: 0 | Status: DISCONTINUED | OUTPATIENT
Start: 2022-07-12 | End: 2022-07-12

## 2022-07-12 RX ORDER — DOCUSATE SODIUM 100 MG
1 CAPSULE ORAL
Qty: 7 | Refills: 0
Start: 2022-07-12 | End: 2022-07-18

## 2022-07-12 RX ORDER — SODIUM CHLORIDE 9 MG/ML
500 INJECTION, SOLUTION INTRAVENOUS ONCE
Refills: 0 | Status: COMPLETED | OUTPATIENT
Start: 2022-07-12 | End: 2022-07-12

## 2022-07-12 RX ORDER — ONDANSETRON 8 MG/1
1 TABLET, FILM COATED ORAL
Qty: 28 | Refills: 0
Start: 2022-07-12 | End: 2022-07-18

## 2022-07-12 RX ORDER — INSULIN LISPRO 100/ML
4 VIAL (ML) SUBCUTANEOUS
Refills: 0 | Status: DISCONTINUED | OUTPATIENT
Start: 2022-07-12 | End: 2022-07-12

## 2022-07-12 RX ORDER — POLYETHYLENE GLYCOL 3350 17 G/17G
17 POWDER, FOR SOLUTION ORAL DAILY
Refills: 0 | Status: DISCONTINUED | OUTPATIENT
Start: 2022-07-12 | End: 2022-07-12

## 2022-07-12 RX ADMIN — OXYCODONE HYDROCHLORIDE 10 MILLIGRAM(S): 5 TABLET ORAL at 18:30

## 2022-07-12 RX ADMIN — Medication 2 UNIT(S): at 07:51

## 2022-07-12 RX ADMIN — SODIUM CHLORIDE 2000 MILLILITER(S): 9 INJECTION, SOLUTION INTRAVENOUS at 07:57

## 2022-07-12 RX ADMIN — OXYCODONE HYDROCHLORIDE 10 MILLIGRAM(S): 5 TABLET ORAL at 17:55

## 2022-07-12 RX ADMIN — Medication 4: at 16:11

## 2022-07-12 RX ADMIN — POLYETHYLENE GLYCOL 3350 17 GRAM(S): 17 POWDER, FOR SOLUTION ORAL at 11:29

## 2022-07-12 RX ADMIN — Medication 4 UNIT(S): at 16:12

## 2022-07-12 RX ADMIN — Medication 4: at 07:51

## 2022-07-12 RX ADMIN — Medication 6: at 11:22

## 2022-07-12 RX ADMIN — METFORMIN HYDROCHLORIDE 1000 MILLIGRAM(S): 850 TABLET ORAL at 17:52

## 2022-07-12 RX ADMIN — Medication 2 UNIT(S): at 11:28

## 2022-07-12 RX ADMIN — PANTOPRAZOLE SODIUM 40 MILLIGRAM(S): 20 TABLET, DELAYED RELEASE ORAL at 07:51

## 2022-07-12 RX ADMIN — METFORMIN HYDROCHLORIDE 1000 MILLIGRAM(S): 850 TABLET ORAL at 05:19

## 2022-07-12 RX ADMIN — CYCLOBENZAPRINE HYDROCHLORIDE 10 MILLIGRAM(S): 10 TABLET, FILM COATED ORAL at 05:18

## 2022-07-12 RX ADMIN — OXYCODONE HYDROCHLORIDE 10 MILLIGRAM(S): 5 TABLET ORAL at 05:28

## 2022-07-12 NOTE — PROGRESS NOTE ADULT - SUBJECTIVE AND OBJECTIVE BOX
Patient is a 46y old  Female who presents with a chief complaint of L5-S1 HNP, x1 episode of bladder incontinence (12 Jul 2022 05:40)    HPI:  Orthopaedic Spine                                                 CHIEF COMPLAINT: RLE Radicular Symptoms, weakness, x1 episode of bladder incontinence Saturday    HPI: Pt is a 46y s/p injury at work on May 6, 2022 who was sent in from the office by Dr. Tapia after reports loss of bladder function Saturday. Patient reports that she injured herself at work, and has had RLE radicular type symptoms, particularly burning shooting down her thigh to her toes. Pt was seen by Dr. Tapia as an outpatient, where imaging obtained demonstrated a L5-S1 HNP, causing compression on the spinal cord. In ED pt complaining of back pain. XR, CT, MR were deferred 2/2 to them being completed as an outpatient. Patient denies additional injury Negative head strike. Able to walk after incident, however has had an antalgic gait limiting her function and quality of life. Denies saddle anesthesia. Reports pain down right leg and RLE paresthesias, LLE denies pain down leg or paresthesias. Denies incontinence of bowel or bladder aside from episode aforementioned on Saturday. Patient denies CP/SOB/N/V. Patient is nervous regarding surgical intervention, which is appropriate. All questions answered to patient's satisfaction.     Pt states she is in good health, follows with her doctors regularly. She takes Metformin 850 for Diabetes daily. No other medical issues. Patient interviewed and examined with  at bedside.     PAST MEDICAL & SURGICAL HISTORY:  Polyp of corpus uteri      H/O dilation and curettage    FAMILY HISTORY:  Family history of diabetes mellitus (DM) (08 Jul 2022 00:13)    SUBJECTIVE & OBJECTIVE: Pt seen and examined at bedside.   PHYSICAL EXAM:  T(C): 37 (07-12-22 @ 11:00), Max: 37.3 (07-12-22 @ 00:01)  HR: 100 (07-12-22 @ 11:00) (94 - 119)  BP: 115/77 (07-12-22 @ 11:00) (104/71 - 125/83)  RR: 17 (07-12-22 @ 11:00) (17 - 18)  SpO2: 95% (07-12-22 @ 11:00) (94% - 95%) Daily     Daily I&O's Detail    11 Jul 2022 07:01  -  12 Jul 2022 07:00  --------------------------------------------------------  IN:    Oral Fluid: 560 mL  Total IN: 560 mL    OUT:  Total OUT: 0 mL    Total NET: 560 mL      12 Jul 2022 07:01  -  12 Jul 2022 13:32  --------------------------------------------------------  IN:    Oral Fluid: 360 mL  Total IN: 360 mL    OUT:  Total OUT: 0 mL    Total NET: 360 mL        GENERAL: NAD, well-groomed, well-developed  HEAD:  Atraumatic, Normocephalic  EYES: EOMI, PERRLA, conjunctiva and sclera clear  ENMT: Moist mucous membranes  NECK: Supple, No JVD  NERVOUS SYSTEM:  Alert & Oriented X3, Motor Strength 5/5 B/L upper and lower extremities; DTRs 2+ intact and symmetric  CHEST/LUNG: Clear to auscultation bilaterally; No rales, rhonchi, wheezing, or rubs  HEART: Regular rate and rhythm; No murmurs, rubs, or gallops  ABDOMEN: Soft, Nontender, Nondistended; Bowel sounds present  EXTREMITIES:  2+ Peripheral Pulses, No clubbing, cyanosis, or edema  LABS:                        11.0   10.45 )-----------( 232      ( 11 Jul 2022 06:10 )             33.8   CAPILLARY BLOOD GLUCOSE      POCT Blood Glucose.: 278 mg/dL (12 Jul 2022 11:01)  POCT Blood Glucose.: 211 mg/dL (12 Jul 2022 07:46)  POCT Blood Glucose.: 252 mg/dL (11 Jul 2022 21:05)  POCT Blood Glucose.: 251 mg/dL (11 Jul 2022 15:41)    RECENT CULTURES:   RADIOLOGY & ADDITIONAL TESTS:

## 2022-07-12 NOTE — PROGRESS NOTE ADULT - PROVIDER SPECIALTY LIST ADULT
Hospitalist
Orthopedics
Hospitalist
Hospitalist
Orthopedics

## 2022-07-12 NOTE — PROGRESS NOTE ADULT - REASON FOR ADMISSION
L5-S1 HNP, x1 episode of bladder incontinence

## 2022-07-12 NOTE — PROGRESS NOTE ADULT - ASSESSMENT
45 y/o female with    Problem/Recommendation - 1:  ·  Problem: Diabetes mellitus with hyperglycemia.   ·  Recommendation: - hemoglobin a1c is 12.1 per the patient her last hgba1c was 6.0  - this elevation is likely due to dietary indiscretion and steroids  - would achieve stricter glycemic control while inpatient.  Keep fingersticks 150's   - Increase lantus to 12 tonight and 3units premeal and titrate to goal of FSG<150  - Increase home medication Metformin to 1000 mg BID  - fingersticks are improving, fingersticks are acceptable for d/c     Problem/Recommendation - 2:  ·  Problem: Suspected cauda equina syndrome.   ·  Recommendation: - plan per primary team.

## 2022-07-12 NOTE — PROGRESS NOTE ADULT - SUBJECTIVE AND OBJECTIVE BOX
Orthopedics  POD 4 s/p L5/S1 Lami/Discectomy    Patient seen and examined at bedside. Pain reports pain well controlled with medications. Otherwise feeling well. No cp sob nausea or vomiting. Able to ambulate with RW. Patient reports no BM, giving bowel regimen.     Vital Signs Last 24 Hrs    LABS:                        11.0   10.45 )-----------( 232      ( 11 Jul 2022 06:10 )             33.8     07-11    139  |  105  |  14  ----------------------------<  272<H>  4.0   |  24  |  0.79    Ca    9.1      11 Jul 2022 06:10            VITAL SIGNS:  T(C): 37.2 (07-12-22 @ 05:16), Max: 37.9 (07-11-22 @ 11:38)  HR: 98 (07-12-22 @ 05:30) (94 - 119)  BP: 111/- (07-12-22 @ 05:30) (104/71 - 125/83)  RR: 18 (07-12-22 @ 05:30) (17 - 18)  SpO2: 95% (07-12-22 @ 05:30) (94% - 95%)      Exam:  Gen: NAD, resting comfortably  Dressing c/d/i    Motor:                   C5                C6              C7               C8           T1   R            5/5                5/5            5/5             5/5          5/5  L             5/5               5/5             5/5             5/5          5/5                L2             L3             L4               L5            S1  R         4/5           4+/5          5/5             5/5           5/5  L          5/5          5/5           5/5             5/5           5/5    Sensory:            C5         C6         C7      C8       T1        (0=absent, 1=impaired, 2=normal, NT=not testable)  R         2            2           2        2         2  L          2            2           2        2         2               L2          L3         L4      L5       S1         (0=absent, 1=impaired, 2=normal, NT=not testable)  R         2            2            2        2        2  L          2            2           2        2         2    Negative Gordon, Negative Clonus, Negative Babinski    **Findings likely 2/2 pain, no true weakness     +DP/PT  Calf NTTP b/l  Compartments soft and compressible    A/P:  46yFemale Stable POD 4  s/p L5/S1 Lami/Discectomy    Medical comanagement appreciated  -FU AM labs  -WBAT  -Pain control PRN  -PT/OT  -Post void residuals obtained within normal limits, no retention  -DVT PE ppx: SCDs only postop spine surgery  -Incentive spirometry  -Dispo: Home per PT recs, pending social work for equipment  -Bowel regimen  -Likely dc today  -Will discuss with attending Dr. Tapia and advise if any changes to plan

## 2022-07-15 ENCOUNTER — APPOINTMENT (OUTPATIENT)
Dept: ORTHOPEDIC SURGERY | Facility: CLINIC | Age: 47
End: 2022-07-15

## 2022-07-15 VITALS — BODY MASS INDEX: 26.03 KG/M2 | WEIGHT: 162 LBS | HEIGHT: 66 IN

## 2022-07-15 PROCEDURE — 72100 X-RAY EXAM L-S SPINE 2/3 VWS: CPT

## 2022-07-15 PROCEDURE — 99024 POSTOP FOLLOW-UP VISIT: CPT

## 2022-07-15 RX ORDER — OXYCODONE 5 MG/1
5 TABLET ORAL EVERY 6 HOURS
Qty: 35 | Refills: 0 | Status: ACTIVE | COMMUNITY
Start: 2022-07-15 | End: 1900-01-01

## 2022-07-15 NOTE — ASSESSMENT
[FreeTextEntry1] : 7/8/22- bilateral L5 laminectomy, right discectomy for BRIELLE\par Seems to have full b/b function at this time, c/w monitoring\par Samuel for residual radic\par Gabapentin- Patient advised of sedating effects, instructed not to drive, operate machinery, or take with other sedating medications. Advised of need to taper on/off medication and risk of abruptly stopping gabapentin. \par PT upon return\par NSAIDs- Patient warned of risk of medication to GI tract, increased blood pressure, cardiac risk, and risk of fluid retention.  Advised to clear medication with internist or PCP if any concurrent health problem with heart, blood pressure, or GI system exists.

## 2022-07-15 NOTE — IMAGING
[de-identified] : LSPINE\par Inspection: No skin discoloration, no erythema\par Palpation: Midline and b/l paraspinal TTP\par ROM: diminished in all planes\par Strength: 5/5 bilateral hip flexors, knee extensors, ankle dorsiflexors, EHL, Left ankle plantarflexors; 4/5 Right Gastroc-Soleus with good effort and unable to perform single leg heel rise on right\par Sensation: Diminished sensation of RLE above and below the knee compared to LLE localized laterally to lateral border of foot. No saddle anesthesia\par Provocative maneuvers: Equivocal R seated straight leg raise\par \par Bilateral hips-\par Palpation: No tenderness to palpation over greater trochanter or IT band on left; TTP R greater troch (mild)\par ROM: No pain with flexion and internal rotation

## 2022-07-15 NOTE — IMAGING
[de-identified] : LSPINE\par Inspection: No skin discoloration, no erythema\par Palpation: Midline and b/l paraspinal TTP\par ROM: diminished in all planes\par Strength: 5/5 bilateral hip flexors, knee extensors, ankle dorsiflexors, EHL, Left ankle plantarflexors; 4/5 Right Gastroc-Soleus with good effort and unable to perform single leg heel rise on right\par Sensation: Diminished sensation of RLE above and below the knee compared to LLE localized laterally to lateral border of foot. No saddle anesthesia\par Provocative maneuvers: Equivocal R seated straight leg raise\par \par Bilateral hips-\par Palpation: No tenderness to palpation over greater trochanter or IT band on left; TTP R greater troch (mild)\par ROM: No pain with flexion and internal rotation

## 2022-07-15 NOTE — HISTORY OF PRESENT ILLNESS
[FreeTextEntry5] : p [de-identified] : 7/9/22 [de-identified] : L5 laminectomy for removal of L5S1 herniated disk [de-identified] : 7/8/22 L5 laminectomy, Right L5/S1 discectomy\par \par WC DOI: 05/06/2022 pt hurt the right leg trying to stop someone from falling.\par Occ: CNA\par \par L spine MRI 6/15/22-\par Findings: Lordosis is maintained. Convex left curvature. No vertebral body compression fracture. No\par spondylolysis.\par T12-L1: No herniation, foraminal stenosis, or central stenosis. Facet hypertrophy.\par L1-L2: No herniation, foraminal stenosis, or central stenosis. Facet hypertrophy and facet effusion.\par L2-L3: No herniation, foraminal stenosis, or central stenosis. Facet hypertrophy and facet effusion.\par L3-L4: No herniation, foraminal stenosis, or central stenosis. Facet hypertrophy and facet effusion.\par L4-L5: No herniation, foraminal stenosis, or central stenosis. Facet hypertrophy and facet effusion.\par L5-S1: Loss of disc signal. Bulge, facet hypertrophy, ligamentum flavum hypertrophy, and facet effusion with\par no foraminal stenosis. Broad central and asymmetric to right herniation impressing on the thecal sac and S1\par nerve roots right greater than left with posterior displacement of the right S1 nerve root and moderate-to-severe\par right-sided canal stenosis.\par Ind. 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 Otoole notes 6/6/22- "Pt is a 46 year old female who presents for evaluation of her RT ankle. No trauma reported. Pt. was seen 5/24 and 5/31/2022 by [facility]: WILVER Gordon initially with c/o right leg pain since early May 2022. She is scheduled to see spine specialist, Dr. Tapia tomorrow. She experiences LBP and pain that radiates into her RT lower extremity. There is numbness/tingling in the RT leg. NSAIDS and muscle relaxer prn. Ambulating with a cane. No previous injury/problem with RT ankle."\par \par 6/7/22- Right-sided LBP radiating to  RLE (glute, lateral thigh/calf, toes 4-5, with N/T), especially with prolonged standing. Patient strained her back on 5/6/22 when trying to catch a patient who was falling. Alleviated with ibuprofen 800 mg and cyclobenzaprine. Denies prior lower back injuries/surgeries. No BB dysfunction. Has appointment for physical therapy 6/20/22. \par \par 6/17/22- MRI follow up. No b/b incontinence. Still severe RLE radicular pain to the toes. \par 7/7/22- Still LBP radiating down the RLE with N/T in the lateral toes and plantar foot. Taking flexeril, kashif, IBU with mild relief. Reports she did have an episode of loss of bladder control 5 nights ago, this is new for her. Was a full void, not a small amount of urine. Was unaware she needed to void. Did not seek medical attention at that time.  Denies saddle anesthesia. \par 7/15/22- Back ache, mild. No pain radiating down the RLE, though has some Numbness localized to the R heel and plantar foot. 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

## 2022-07-15 NOTE — HISTORY OF PRESENT ILLNESS
[FreeTextEntry5] : p [de-identified] : 7/9/22 [de-identified] : L5 laminectomy for removal of L5S1 herniated disk [de-identified] : 7/8/22 L5 laminectomy, Right L5/S1 discectomy\par \par WC DOI: 05/06/2022 pt hurt the right leg trying to stop someone from falling.\par Occ: CNA\par \par L spine MRI 6/15/22-\par Findings: Lordosis is maintained. Convex left curvature. No vertebral body compression fracture. No\par spondylolysis.\par T12-L1: No herniation, foraminal stenosis, or central stenosis. Facet hypertrophy.\par L1-L2: No herniation, foraminal stenosis, or central stenosis. Facet hypertrophy and facet effusion.\par L2-L3: No herniation, foraminal stenosis, or central stenosis. Facet hypertrophy and facet effusion.\par L3-L4: No herniation, foraminal stenosis, or central stenosis. Facet hypertrophy and facet effusion.\par L4-L5: No herniation, foraminal stenosis, or central stenosis. Facet hypertrophy and facet effusion.\par L5-S1: Loss of disc signal. Bulge, facet hypertrophy, ligamentum flavum hypertrophy, and facet effusion with\par no foraminal stenosis. Broad central and asymmetric to right herniation impressing on the thecal sac and S1\par nerve roots right greater than left with posterior displacement of the right S1 nerve root and moderate-to-severe\par right-sided canal stenosis.\par Ind. 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 Otoole notes 6/6/22- "Pt is a 46 year old female who presents for evaluation of her RT ankle. No trauma reported. Pt. was seen 5/24 and 5/31/2022 by [facility]: WILVER Gordon initially with c/o right leg pain since early May 2022. She is scheduled to see spine specialist, Dr. Tapia tomorrow. She experiences LBP and pain that radiates into her RT lower extremity. There is numbness/tingling in the RT leg. NSAIDS and muscle relaxer prn. Ambulating with a cane. No previous injury/problem with RT ankle."\par \par 6/7/22- Right-sided LBP radiating to  RLE (glute, lateral thigh/calf, toes 4-5, with N/T), especially with prolonged standing. Patient strained her back on 5/6/22 when trying to catch a patient who was falling. Alleviated with ibuprofen 800 mg and cyclobenzaprine. Denies prior lower back injuries/surgeries. No BB dysfunction. Has appointment for physical therapy 6/20/22. \par \par 6/17/22- MRI follow up. No b/b incontinence. Still severe RLE radicular pain to the toes. \par 7/7/22- Still LBP radiating down the RLE with N/T in the lateral toes and plantar foot. Taking flexeril, kashif, IBU with mild relief. Reports she did have an episode of loss of bladder control 5 nights ago, this is new for her. Was a full void, not a small amount of urine. Was unaware she needed to void. Did not seek medical attention at that time.  Denies saddle anesthesia. \par 7/15/22- Back ache, mild. No pain radiating down the RLE, though has some Numbness localized to the R heel and plantar foot. 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

## 2022-07-16 PROBLEM — E11.9 TYPE 2 DIABETES MELLITUS WITHOUT COMPLICATIONS: Chronic | Status: ACTIVE | Noted: 2022-07-08

## 2022-07-21 DIAGNOSIS — G83.4 CAUDA EQUINA SYNDROME: ICD-10-CM

## 2022-07-21 DIAGNOSIS — R32 UNSPECIFIED URINARY INCONTINENCE: ICD-10-CM

## 2022-07-21 DIAGNOSIS — G95.20 UNSPECIFIED CORD COMPRESSION: ICD-10-CM

## 2022-07-21 DIAGNOSIS — E11.65 TYPE 2 DIABETES MELLITUS WITH HYPERGLYCEMIA: ICD-10-CM

## 2022-07-21 DIAGNOSIS — M51.17 INTERVERTEBRAL DISC DISORDERS WITH RADICULOPATHY, LUMBOSACRAL REGION: ICD-10-CM

## 2022-08-23 ENCOUNTER — APPOINTMENT (OUTPATIENT)
Dept: ORTHOPEDIC SURGERY | Facility: CLINIC | Age: 47
End: 2022-08-23

## 2022-08-23 VITALS — WEIGHT: 162 LBS | BODY MASS INDEX: 26.03 KG/M2 | HEIGHT: 66 IN

## 2022-08-23 PROCEDURE — 99024 POSTOP FOLLOW-UP VISIT: CPT

## 2022-08-23 NOTE — ASSESSMENT
[FreeTextEntry1] : 7/8/22- bilateral L5 laminectomy, right discectomy for BRIELLE\par Seems to have full b/b function at this time, c/w monitoring\par Initiate PT\par \par NSAIDs- Patient warned of risk of medication to GI tract, increased blood pressure, cardiac risk, and risk of fluid retention.  Advised to clear medication with internist or PCP if any concurrent health problem with heart, blood pressure, or GI system exists.

## 2022-08-23 NOTE — IMAGING
[de-identified] : LSPINE\par Inspection: No skin discoloration, no erythema\par Palpation: b/l paraspinal TTP\par ROM: diminished in all planes\par Strength: 5/5 bilateral hip flexors, knee extensors, ankle dorsiflexors, EHL, Left ankle plantarflexors; 4/5 Right Gastroc-Soleus with good effort and unable to perform single leg heel rise on right\par Sensation: SPLT L2-S1 b/l\par Provocative maneuvers: - R seated straight leg raise\par \par Bilateral hips-\par Palpation: No tenderness to palpation over greater trochanter or IT band on left; TTP R greater troch (mild)\par ROM: No pain with flexion and internal rotation

## 2022-08-23 NOTE — HISTORY OF PRESENT ILLNESS
[8] : 8 [Sharp] : sharp [Rest] : rest [Meds] : meds [Walking] : walking [de-identified] : 7/8/22 L5 laminectomy, Right L5/S1 discectomy\par \par WC DOI: 05/06/2022 pt hurt the right leg trying to stop someone from falling.\par Occ: CNA\par \par L spine MRI 6/15/22-\par Findings: Lordosis is maintained. Convex left curvature. No vertebral body compression fracture. No\par spondylolysis.\par T12-L1: No herniation, foraminal stenosis, or central stenosis. Facet hypertrophy.\par L1-L2: No herniation, foraminal stenosis, or central stenosis. Facet hypertrophy and facet effusion.\par L2-L3: No herniation, foraminal stenosis, or central stenosis. Facet hypertrophy and facet effusion.\par L3-L4: No herniation, foraminal stenosis, or central stenosis. Facet hypertrophy and facet effusion.\par L4-L5: No herniation, foraminal stenosis, or central stenosis. Facet hypertrophy and facet effusion.\par L5-S1: Loss of disc signal. Bulge, facet hypertrophy, ligamentum flavum hypertrophy, and facet effusion with\par no foraminal stenosis. Broad central and asymmetric to right herniation impressing on the thecal sac and S1\par nerve roots right greater than left with posterior displacement of the right S1 nerve root and moderate-to-severe\par right-sided canal stenosis.\par Ind. 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 Otoole notes 6/6/22- "Pt is a 46 year old female who presents for evaluation of her RT ankle. No trauma reported. Pt. was seen 5/24 and 5/31/2022 by [facility]: WILVER Gordon initially with c/o right leg pain since early May 2022. She is scheduled to see spine specialist, Dr. Tapia tomorrow. She experiences LBP and pain that radiates into her RT lower extremity. There is numbness/tingling in the RT leg. NSAIDS and muscle relaxer prn. Ambulating with a cane. No previous injury/problem with RT ankle."\par \par 6/7/22- Right-sided LBP radiating to  RLE (glute, lateral thigh/calf, toes 4-5, with N/T), especially with prolonged standing. Patient strained her back on 5/6/22 when trying to catch a patient who was falling. Alleviated with ibuprofen 800 mg and cyclobenzaprine. Denies prior lower back injuries/surgeries. No BB dysfunction. Has appointment for physical therapy 6/20/22. \par \par 6/17/22- MRI follow up. No b/b incontinence. Still severe RLE radicular pain to the toes. \par 7/7/22- Still LBP radiating down the RLE with N/T in the lateral toes and plantar foot. Taking flexeril, kashif, IBU with mild relief. Reports she did have an episode of loss of bladder control 5 nights ago, this is new for her. Was a full void, not a small amount of urine. Was unaware she needed to void. Did not seek medical attention at that time.  Denies saddle anesthesia. \par 7/15/22- Back ache, mild. No pain radiating down the RLE, though has some Numbness localized to the R heel and plantar foot. Voiding with control, feels she is adequately emptying\par 8/23/22- LBP much improved, with no radiation down RLE. Denies b/b incontinence.  [FreeTextEntry5] : lloyd estevez,

## 2022-08-23 NOTE — WORK
[Other: ___] : [unfilled] [Was the competent medical cause of the injury] : was the competent medical cause of the injury [Are consistent with the injury] : are consistent with the injury [Consistent with my objective findings] : consistent with my objective findings [Total] : total [Rx may affect patient's ability to return to work, make patient drowsy, or other issue] : Rx may affect patient's ability to return to work, make patient drowsy, or other issue. [I provided the services listed above] :  I provided the services listed above. [Cannot return to work because ________] : cannot return to work because [unfilled]

## 2022-11-01 ENCOUNTER — APPOINTMENT (OUTPATIENT)
Dept: ORTHOPEDIC SURGERY | Facility: CLINIC | Age: 47
End: 2022-11-01

## 2022-11-01 PROCEDURE — 99214 OFFICE O/P EST MOD 30 MIN: CPT

## 2022-11-01 PROCEDURE — 99072 ADDL SUPL MATRL&STAF TM PHE: CPT

## 2022-11-01 NOTE — IMAGING
[de-identified] : LSPINE\par Inspection: No skin discoloration, no erythema\par Palpation: b/l paraspinal TTP\par ROM: diminished in all planes\par Strength: 5/5 bilateral hip flexors, knee extensors, ankle dorsiflexors, EHL, Left ankle plantarflexors; 4/5 Right Gastroc-Soleus with good effort and unable to perform single leg heel rise on right\par Sensation: SPLT L2-S1 b/l\par Provocative maneuvers: - R seated straight leg raise\par \par Bilateral hips-\par Palpation: No tenderness to palpation over greater trochanter or IT band on left; TTP R greater troch (mild)\par ROM: No pain with flexion and internal rotation

## 2022-11-01 NOTE — ASSESSMENT
[FreeTextEntry1] : 7/8/22- bilateral L5 laminectomy, right discectomy for BRIELLE\par Seems to have full b/b function at this time, c/w monitoring\par Initiate PT, this is a critical portion of her recovery\par \par NSAIDs- Patient warned of risk of medication to GI tract, increased blood pressure, cardiac risk, and risk of fluid retention.  Advised to clear medication with internist or PCP if any concurrent health problem with heart, blood pressure, or GI system exists.

## 2022-11-01 NOTE — HISTORY OF PRESENT ILLNESS
[Lower back] : lower back [8] : 8 [Sharp] : sharp [Rest] : rest [Meds] : meds [Walking] : walking [de-identified] : 7/8/22 L5 laminectomy, Right L5/S1 discectomy\par \par WC DOI: 05/06/2022 pt hurt the right leg trying to stop someone from falling.\par Occ: CNA\par \par L spine MRI 6/15/22-\par Findings: Lordosis is maintained. Convex left curvature. No vertebral body compression fracture. No\par spondylolysis.\par T12-L1: No herniation, foraminal stenosis, or central stenosis. Facet hypertrophy.\par L1-L2: No herniation, foraminal stenosis, or central stenosis. Facet hypertrophy and facet effusion.\par L2-L3: No herniation, foraminal stenosis, or central stenosis. Facet hypertrophy and facet effusion.\par L3-L4: No herniation, foraminal stenosis, or central stenosis. Facet hypertrophy and facet effusion.\par L4-L5: No herniation, foraminal stenosis, or central stenosis. Facet hypertrophy and facet effusion.\par L5-S1: Loss of disc signal. Bulge, facet hypertrophy, ligamentum flavum hypertrophy, and facet effusion with\par no foraminal stenosis. Broad central and asymmetric to right herniation impressing on the thecal sac and S1\par nerve roots right greater than left with posterior displacement of the right S1 nerve root and moderate-to-severe\par right-sided canal stenosis.\par Ind. 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 Otoole notes 6/6/22- "Pt is a 46 year old female who presents for evaluation of her RT ankle. No trauma reported. Pt. was seen 5/24 and 5/31/2022 by [facility]: WILVER Gordon initially with c/o right leg pain since early May 2022. She is scheduled to see spine specialist, Dr. Tapia tomorrow. She experiences LBP and pain that radiates into her RT lower extremity. There is numbness/tingling in the RT leg. NSAIDS and muscle relaxer prn. Ambulating with a cane. No previous injury/problem with RT ankle."\par \par 6/7/22- Right-sided LBP radiating to  RLE (glute, lateral thigh/calf, toes 4-5, with N/T), especially with prolonged standing. Patient strained her back on 5/6/22 when trying to catch a patient who was falling. Alleviated with ibuprofen 800 mg and cyclobenzaprine. Denies prior lower back injuries/surgeries. No BB dysfunction. Has appointment for physical therapy 6/20/22. \par \par 6/17/22- MRI follow up. No b/b incontinence. Still severe RLE radicular pain to the toes. \par 7/7/22- Still LBP radiating down the RLE with N/T in the lateral toes and plantar foot. Taking flexeril, kashif, IBU with mild relief. Reports she did have an episode of loss of bladder control 5 nights ago, this is new for her. Was a full void, not a small amount of urine. Was unaware she needed to void. Did not seek medical attention at that time.  Denies saddle anesthesia. \par 7/15/22- Back ache, mild. No pain radiating down the RLE, though has some Numbness localized to the R heel and plantar foot. Voiding with control, feels she is adequately emptying\par 8/23/22- LBP much improved, with no radiation down RLE. Denies b/b incontinence. \par 11/1/22- Still low back ache. Intermittent N/T to the lateral R foot with ambulation, no sig. pain. Has not been approved for PT [FreeTextEntry5] : lloyd etsevez,

## 2022-11-14 ENCOUNTER — FORM ENCOUNTER (OUTPATIENT)
Age: 47
End: 2022-11-14

## 2022-12-13 ENCOUNTER — APPOINTMENT (OUTPATIENT)
Dept: ORTHOPEDIC SURGERY | Facility: CLINIC | Age: 47
End: 2022-12-13

## 2022-12-13 PROCEDURE — 99072 ADDL SUPL MATRL&STAF TM PHE: CPT

## 2022-12-13 PROCEDURE — 99214 OFFICE O/P EST MOD 30 MIN: CPT

## 2022-12-13 NOTE — HISTORY OF PRESENT ILLNESS
[Lower back] : lower back [8] : 8 [Sharp] : sharp [Rest] : rest [Meds] : meds [Walking] : walking [de-identified] : 7/8/22 L5 laminectomy, Right L5/S1 discectomy\par \par WC DOI: 05/06/2022 pt hurt the right leg trying to stop someone from falling.\par Occ: CNA\par \par L spine MRI 6/15/22-\par Findings: Lordosis is maintained. Convex left curvature. No vertebral body compression fracture. No\par spondylolysis.\par T12-L1: No herniation, foraminal stenosis, or central stenosis. Facet hypertrophy.\par L1-L2: No herniation, foraminal stenosis, or central stenosis. Facet hypertrophy and facet effusion.\par L2-L3: No herniation, foraminal stenosis, or central stenosis. Facet hypertrophy and facet effusion.\par L3-L4: No herniation, foraminal stenosis, or central stenosis. Facet hypertrophy and facet effusion.\par L4-L5: No herniation, foraminal stenosis, or central stenosis. Facet hypertrophy and facet effusion.\par L5-S1: Loss of disc signal. Bulge, facet hypertrophy, ligamentum flavum hypertrophy, and facet effusion with\par no foraminal stenosis. Broad central and asymmetric to right herniation impressing on the thecal sac and S1\par nerve roots right greater than left with posterior displacement of the right S1 nerve root and moderate-to-severe\par right-sided canal stenosis.\par Ind. 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 Otoole notes 6/6/22- "Pt is a 46 year old female who presents for evaluation of her RT ankle. No trauma reported. Pt. was seen 5/24 and 5/31/2022 by [facility]: WILVER Gordon initially with c/o right leg pain since early May 2022. She is scheduled to see spine specialist, Dr. Tapia tomorrow. She experiences LBP and pain that radiates into her RT lower extremity. There is numbness/tingling in the RT leg. NSAIDS and muscle relaxer prn. Ambulating with a cane. No previous injury/problem with RT ankle."\par \par 6/7/22- Right-sided LBP radiating to  RLE (glute, lateral thigh/calf, toes 4-5, with N/T), especially with prolonged standing. Patient strained her back on 5/6/22 when trying to catch a patient who was falling. Alleviated with ibuprofen 800 mg and cyclobenzaprine. Denies prior lower back injuries/surgeries. No BB dysfunction. Has appointment for physical therapy 6/20/22. \par \par 6/17/22- MRI follow up. No b/b incontinence. Still severe RLE radicular pain to the toes. \par 7/7/22- Still LBP radiating down the RLE with N/T in the lateral toes and plantar foot. Taking flexeril, kashif, IBU with mild relief. Reports she did have an episode of loss of bladder control 5 nights ago, this is new for her. Was a full void, not a small amount of urine. Was unaware she needed to void. Did not seek medical attention at that time.  Denies saddle anesthesia. \par 7/15/22- Back ache, mild. No pain radiating down the RLE, though has some Numbness localized to the R heel and plantar foot. Voiding with control, feels she is adequately emptying\par 8/23/22- LBP much improved, with no radiation down RLE. Denies b/b incontinence. \par 11/1/22- Still low back ache. Intermittent N/T to the lateral R foot with ambulation, no sig. pain. Has not been approved for PT\par 12/13/22- Worsening LBP with N/T in lateral R foot with walking. Denies b/b dysfunction. PT has not been approved.  [FreeTextEntry5] : lloyd estevez,  [de-identified] : none

## 2022-12-13 NOTE — IMAGING
[de-identified] : LSPINE\par Inspection: No skin discoloration, no erythema\par Palpation: b/l paraspinal TTP\par ROM: diminished in all planes\par Strength: 5/5 bilateral hip flexors, knee extensors, ankle dorsiflexors, EHL, Left ankle plantarflexors; 4/5 Right Gastroc-Soleus with good effort and unable to perform single leg heel rise on right\par Sensation: SPLT L2-S1 b/l\par Provocative maneuvers: - R seated straight leg raise\par \par Bilateral hips-\par Palpation: No tenderness to palpation over greater trochanter or IT band on left; TTP R greater troch (mild)\par ROM: No pain with flexion and internal rotation

## 2023-02-07 ENCOUNTER — APPOINTMENT (OUTPATIENT)
Dept: ORTHOPEDIC SURGERY | Facility: CLINIC | Age: 48
End: 2023-02-07
Payer: OTHER MISCELLANEOUS

## 2023-02-07 PROCEDURE — 99072 ADDL SUPL MATRL&STAF TM PHE: CPT

## 2023-02-07 PROCEDURE — 99214 OFFICE O/P EST MOD 30 MIN: CPT

## 2023-02-07 NOTE — HISTORY OF PRESENT ILLNESS
[Lower back] : lower back [8] : 8 [Sharp] : sharp [Rest] : rest [Meds] : meds [Walking] : walking [de-identified] : 7/8/22 L5 laminectomy, Right L5/S1 discectomy\par \par WC DOI: 05/06/2022 pt hurt the right leg trying to stop someone from falling.\par Occ: CNA\par \par L spine MRI 6/15/22-\par Findings: Lordosis is maintained. Convex left curvature. No vertebral body compression fracture. No\par spondylolysis.\par T12-L1: No herniation, foraminal stenosis, or central stenosis. Facet hypertrophy.\par L1-L2: No herniation, foraminal stenosis, or central stenosis. Facet hypertrophy and facet effusion.\par L2-L3: No herniation, foraminal stenosis, or central stenosis. Facet hypertrophy and facet effusion.\par L3-L4: No herniation, foraminal stenosis, or central stenosis. Facet hypertrophy and facet effusion.\par L4-L5: No herniation, foraminal stenosis, or central stenosis. Facet hypertrophy and facet effusion.\par L5-S1: Loss of disc signal. Bulge, facet hypertrophy, ligamentum flavum hypertrophy, and facet effusion with\par no foraminal stenosis. Broad central and asymmetric to right herniation impressing on the thecal sac and S1\par nerve roots right greater than left with posterior displacement of the right S1 nerve root and moderate-to-severe\par right-sided canal stenosis.\par Ind. 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 Otoole notes 6/6/22- "Pt is a 46 year old female who presents for evaluation of her RT ankle. No trauma reported. Pt. was seen 5/24 and 5/31/2022 by [facility]: WILVER Gordon initially with c/o right leg pain since early May 2022. She is scheduled to see spine specialist, Dr. Tapia tomorrow. She experiences LBP and pain that radiates into her RT lower extremity. There is numbness/tingling in the RT leg. NSAIDS and muscle relaxer prn. Ambulating with a cane. No previous injury/problem with RT ankle."\par \par 6/7/22- Right-sided LBP radiating to  RLE (glute, lateral thigh/calf, toes 4-5, with N/T), especially with prolonged standing. Patient strained her back on 5/6/22 when trying to catch a patient who was falling. Alleviated with ibuprofen 800 mg and cyclobenzaprine. Denies prior lower back injuries/surgeries. No BB dysfunction. Has appointment for physical therapy 6/20/22. \par \par 6/17/22- MRI follow up. No b/b incontinence. Still severe RLE radicular pain to the toes. \par 7/7/22- Still LBP radiating down the RLE with N/T in the lateral toes and plantar foot. Taking flexeril, kashif, IBU with mild relief. Reports she did have an episode of loss of bladder control 5 nights ago, this is new for her. Was a full void, not a small amount of urine. Was unaware she needed to void. Did not seek medical attention at that time.  Denies saddle anesthesia. \par 7/15/22- Back ache, mild. No pain radiating down the RLE, though has some Numbness localized to the R heel and plantar foot. Voiding with control, feels she is adequately emptying\par 8/23/22- LBP much improved, with no radiation down RLE. Denies b/b incontinence. \par 11/1/22- Still low back ache. Intermittent N/T to the lateral R foot with ambulation, no sig. pain. Has not been approved for PT\par 12/13/22- Worsening LBP with N/T in lateral R foot with walking. Denies b/b dysfunction. PT has not been approved. \par 2/7/23- LBP slightly improving, althoug lingering soreness. PT still denied. Denies b/b dysfunction. Continued N/T in R lateral foot.  [FreeTextEntry5] : lloyd estevez,  [de-identified] : none

## 2023-02-07 NOTE — RETURN TO WORK/SCHOOL
[Work] : work [___ Months] : I will re-evaluate the patient in [unfilled] month(s), at which time I will provide an update to their current status

## 2023-02-07 NOTE — IMAGING
[de-identified] : LSPINE\par Palpation: b/l paraspinal TTP\par ROM: diminished in all planes\par Strength: 5/5 bilateral hip flexors, knee extensors, ankle dorsiflexors, EHL, Left ankle plantarflexors; 4/5 Right Gastroc-Soleus with good effort and able to perform single leg heel rise on right\par Sensation: SPLT L2-S1 b/l\par Provocative maneuvers: - R seated straight leg raise\par \par Bilateral hips-\par Palpation: No tenderness to palpation over greater trochanter or IT band on left; TTP R greater troch (mild)\par ROM: No pain with flexion and internal rotation

## 2023-02-07 NOTE — ASSESSMENT
[FreeTextEntry1] : 7/8/22- bilateral L5 laminectomy, right discectomy for BRIELLE\par Seems to have full b/b function at this time, c/w monitoring\par Initiate PT, this is a critical portion of her recovery\par \par NSAIDs- Patient warned of risk of medication to GI tract, increased blood pressure, cardiac risk, and risk of fluid retention.  Advised to clear medication with internist or PCP if any concurrent health problem with heart, blood pressure, or GI system exists. \par \par Patient seen by Queenie Godwin PA-C, with and under the supervision of  Dr. Tevin Tapia M.D.\par

## 2023-03-27 ENCOUNTER — APPOINTMENT (OUTPATIENT)
Dept: ORTHOPEDIC SURGERY | Facility: CLINIC | Age: 48
End: 2023-03-27
Payer: OTHER MISCELLANEOUS

## 2023-03-27 PROCEDURE — 99213 OFFICE O/P EST LOW 20 MIN: CPT

## 2023-03-27 NOTE — RETURN TO WORK/SCHOOL
[Work] : work [___ Weeks] : I will re-evaluate the patient in [unfilled] week(s), at which time I will provide an update to their current status No

## 2023-03-27 NOTE — WORK
[Other: ___] : [unfilled] [Was the competent medical cause of the injury] : was the competent medical cause of the injury [Are consistent with the injury] : are consistent with the injury [Consistent with my objective findings] : consistent with my objective findings [Cannot return to work because ________] : cannot return to work because [unfilled] [Rx may affect patient's ability to return to work, make patient drowsy, or other issue] : Rx may affect patient's ability to return to work, make patient drowsy, or other issue. [I provided the services listed above] :  I provided the services listed above.

## 2023-03-27 NOTE — ASSESSMENT
[FreeTextEntry1] : 7/8/22- bilateral L5 laminectomy, right discectomy for BRIELLE\par Seems to have full b/b function at this time, c/w monitoring\par Initiate PT, this is a critical portion of her recovery\par \par continued mobility/trunk weakness, given she has not been permitted to enroll in formal therapy. Indicated for LSO for intermittent stabilization. \par \par NSAIDs- Patient warned of risk of medication to GI tract, increased blood pressure, cardiac risk, and risk of fluid retention.  Advised to clear medication with internist or PCP if any concurrent health problem with heart, blood pressure, or GI system exists. \par \par Patient seen by Queenie Godwin PA-C, with and under the supervision of  Dr. Tevin Tapia M.D.\par

## 2023-03-27 NOTE — IMAGING
[de-identified] : LSPINE\par Palpation: b/l paraspinal TTP\par ROM: diminished in all planes\par Strength: 5/5 bilateral hip flexors, knee extensors, ankle dorsiflexors, EHL, Left ankle plantarflexors; 4/5 Right Gastroc-Soleus with good effort and able to perform single leg heel rise on right\par Sensation: SPLT L2-S1 b/l\par Provocative maneuvers: - R seated straight leg raise\par \par Bilateral hips-\par Palpation: No tenderness to palpation over greater trochanter or IT band on left; TTP R greater troch (mild)\par ROM: No pain with flexion and internal rotation

## 2023-03-27 NOTE — HISTORY OF PRESENT ILLNESS
[Lower back] : lower back [8] : 8 [Sharp] : sharp [Rest] : rest [Meds] : meds [Walking] : walking [de-identified] : 7/8/22 L5 laminectomy, Right L5/S1 discectomy\par \par WC DOI: 05/06/2022 pt hurt the right leg trying to stop someone from falling.\par Occ: CNA\par \par L spine MRI 6/15/22-\par Findings: Lordosis is maintained. Convex left curvature. No vertebral body compression fracture. No\par spondylolysis.\par T12-L1: No herniation, foraminal stenosis, or central stenosis. Facet hypertrophy.\par L1-L2: No herniation, foraminal stenosis, or central stenosis. Facet hypertrophy and facet effusion.\par L2-L3: No herniation, foraminal stenosis, or central stenosis. Facet hypertrophy and facet effusion.\par L3-L4: No herniation, foraminal stenosis, or central stenosis. Facet hypertrophy and facet effusion.\par L4-L5: No herniation, foraminal stenosis, or central stenosis. Facet hypertrophy and facet effusion.\par L5-S1: Loss of disc signal. Bulge, facet hypertrophy, ligamentum flavum hypertrophy, and facet effusion with\par no foraminal stenosis. Broad central and asymmetric to right herniation impressing on the thecal sac and S1\par nerve roots right greater than left with posterior displacement of the right S1 nerve root and moderate-to-severe\par right-sided canal stenosis.\par Ind. 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 Otoole notes 6/6/22- "Pt is a 46 year old female who presents for evaluation of her RT ankle. No trauma reported. Pt. was seen 5/24 and 5/31/2022 by [facility]: WILVER Gordon initially with c/o right leg pain since early May 2022. She is scheduled to see spine specialist, Dr. Tapia tomorrow. She experiences LBP and pain that radiates into her RT lower extremity. There is numbness/tingling in the RT leg. NSAIDS and muscle relaxer prn. Ambulating with a cane. No previous injury/problem with RT ankle."\par \par 6/7/22- Right-sided LBP radiating to  RLE (glute, lateral thigh/calf, toes 4-5, with N/T), especially with prolonged standing. Patient strained her back on 5/6/22 when trying to catch a patient who was falling. Alleviated with ibuprofen 800 mg and cyclobenzaprine. Denies prior lower back injuries/surgeries. No BB dysfunction. Has appointment for physical therapy 6/20/22. \par \par 6/17/22- MRI follow up. No b/b incontinence. Still severe RLE radicular pain to the toes. \par 7/7/22- Still LBP radiating down the RLE with N/T in the lateral toes and plantar foot. Taking flexeril, kashif, IBU with mild relief. Reports she did have an episode of loss of bladder control 5 nights ago, this is new for her. Was a full void, not a small amount of urine. Was unaware she needed to void. Did not seek medical attention at that time.  Denies saddle anesthesia. \par 7/15/22- Back ache, mild. No pain radiating down the RLE, though has some Numbness localized to the R heel and plantar foot. Voiding with control, feels she is adequately emptying\par 8/23/22- LBP much improved, with no radiation down RLE. Denies b/b incontinence. \par 11/1/22- Still low back ache. Intermittent N/T to the lateral R foot with ambulation, no sig. pain. Has not been approved for PT\par 12/13/22- Worsening LBP with N/T in lateral R foot with walking. Denies b/b dysfunction. PT has not been approved. \par 2/7/23- LBP slightly improving, althoug lingering soreness. PT still denied. Denies b/b dysfunction. Continued N/T in R lateral foot. \par 3/27/23- LBP slightly improved. PT denied.  Denies b/b dysfunction. N/T in R foot improved.  [FreeTextEntry5] : lloyd estevez,  [de-identified] : none

## 2023-05-08 ENCOUNTER — NON-APPOINTMENT (OUTPATIENT)
Age: 48
End: 2023-05-08

## 2023-05-08 ENCOUNTER — APPOINTMENT (OUTPATIENT)
Dept: ORTHOPEDIC SURGERY | Facility: CLINIC | Age: 48
End: 2023-05-08
Payer: COMMERCIAL

## 2023-05-08 VITALS — WEIGHT: 162 LBS | BODY MASS INDEX: 26.03 KG/M2 | HEIGHT: 66 IN

## 2023-05-08 DIAGNOSIS — G83.4 CAUDA EQUINA SYNDROME: ICD-10-CM

## 2023-05-08 PROCEDURE — 99213 OFFICE O/P EST LOW 20 MIN: CPT

## 2023-05-08 PROCEDURE — 99214 OFFICE O/P EST MOD 30 MIN: CPT

## 2023-05-08 NOTE — RETURN TO WORK/SCHOOL
[Return Date: _____] : as of [unfilled].  This has been discussed in detail with ~Yen~ and ~he/she~ understands this.

## 2023-05-08 NOTE — HISTORY OF PRESENT ILLNESS
[Lower back] : lower back [8] : 8 [Sharp] : sharp [Rest] : rest [Meds] : meds [Walking] : walking [de-identified] : 7/8/22 L5 laminectomy, Right L5/S1 discectomy\par \par WC DOI: 05/06/2022 pt hurt the right leg trying to stop someone from falling.\par Occ: CNA\par \par L spine MRI 6/15/22-\par Findings: Lordosis is maintained. Convex left curvature. No vertebral body compression fracture. No\par spondylolysis.\par T12-L1: No herniation, foraminal stenosis, or central stenosis. Facet hypertrophy.\par L1-L2: No herniation, foraminal stenosis, or central stenosis. Facet hypertrophy and facet effusion.\par L2-L3: No herniation, foraminal stenosis, or central stenosis. Facet hypertrophy and facet effusion.\par L3-L4: No herniation, foraminal stenosis, or central stenosis. Facet hypertrophy and facet effusion.\par L4-L5: No herniation, foraminal stenosis, or central stenosis. Facet hypertrophy and facet effusion.\par L5-S1: Loss of disc signal. Bulge, facet hypertrophy, ligamentum flavum hypertrophy, and facet effusion with\par no foraminal stenosis. Broad central and asymmetric to right herniation impressing on the thecal sac and S1\par nerve roots right greater than left with posterior displacement of the right S1 nerve root and moderate-to-severe\par right-sided canal stenosis.\par Ind. 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 Otoole notes 6/6/22- "Pt is a 46 year old female who presents for evaluation of her RT ankle. No trauma reported. Pt. was seen 5/24 and 5/31/2022 by [facility]: WILVER Gordon initially with c/o right leg pain since early May 2022. She is scheduled to see spine specialist, Dr. Tapai tomorrow. She experiences LBP and pain that radiates into her RT lower extremity. There is numbness/tingling in the RT leg. NSAIDS and muscle relaxer prn. Ambulating with a cane. No previous injury/problem with RT ankle."\par \par 6/7/22- Right-sided LBP radiating to  RLE (glute, lateral thigh/calf, toes 4-5, with N/T), especially with prolonged standing. Patient strained her back on 5/6/22 when trying to catch a patient who was falling. Alleviated with ibuprofen 800 mg and cyclobenzaprine. Denies prior lower back injuries/surgeries. No BB dysfunction. Has appointment for physical therapy 6/20/22. \par \par 6/17/22- MRI follow up. No b/b incontinence. Still severe RLE radicular pain to the toes. \par 7/7/22- Still LBP radiating down the RLE with N/T in the lateral toes and plantar foot. Taking flexeril, kashif, IBU with mild relief. Reports she did have an episode of loss of bladder control 5 nights ago, this is new for her. Was a full void, not a small amount of urine. Was unaware she needed to void. Did not seek medical attention at that time.  Denies saddle anesthesia. \par 7/15/22- Back ache, mild. No pain radiating down the RLE, though has some Numbness localized to the R heel and plantar foot. Voiding with control, feels she is adequately emptying\par 8/23/22- LBP much improved, with no radiation down RLE. Denies b/b incontinence. \par 11/1/22- Still low back ache. Intermittent N/T to the lateral R foot with ambulation, no sig. pain. Has not been approved for PT\par 12/13/22- Worsening LBP with N/T in lateral R foot with walking. Denies b/b dysfunction. PT has not been approved. \par 2/7/23- LBP slightly improving, althoug lingering soreness. PT still denied. Denies b/b dysfunction. Continued N/T in R lateral foot. \par 3/27/23- LBP slightly improved. PT denied.  Denies b/b dysfunction. N/T in R foot improved. \par 5/8/23- PT denies, still aching back with activity. Still some intermittent N/T in the R foot [FreeTextEntry5] : lloyd estevez,  [de-identified] : none

## 2023-05-08 NOTE — IMAGING
[de-identified] : LSPINE\par Palpation: b/l paraspinal TTP\par ROM: diminished in all planes\par Strength: 5/5 bilateral hip flexors, knee extensors, ankle dorsiflexors, EHL, Left ankle plantarflexors; 4/5 Right Gastroc-Soleus with good effort and able to perform single leg heel rise on right\par Sensation: SPLT L2-S1 b/l\par Provocative maneuvers: + R seated straight leg raise\par \par Bilateral hips-\par Palpation: No tenderness to palpation over greater trochanter or IT band on left; TTP R greater troch (mild)\par ROM: No pain with flexion and internal rotation

## 2023-05-08 NOTE — WORK
[Other: ___] : [unfilled] [Was the competent medical cause of the injury] : was the competent medical cause of the injury [Are consistent with the injury] : are consistent with the injury [Consistent with my objective findings] : consistent with my objective findings [Rx may affect patient's ability to return to work, make patient drowsy, or other issue] : Rx may affect patient's ability to return to work, make patient drowsy, or other issue. [I provided the services listed above] :  I provided the services listed above. [Partial] : partial

## 2023-05-08 NOTE — ASSESSMENT
[FreeTextEntry1] : 7/8/22- bilateral L5 laminectomy, right discectomy for BRIELLE\par Seems to have full b/b function at this time, c/w monitoring\par Initiate PT, this is a critical portion of her recovery\par Has developed intermittent radicular pain with recurrence of tension signs on exam\par This would reasonably be addressed with standard post-operative PT\par \par continued mobility/trunk weakness, given she has not been permitted to enroll in formal therapy. Indicated for LSO for intermittent stabilization. \par \par NSAIDs- Patient warned of risk of medication to GI tract, increased blood pressure, cardiac risk, and risk of fluid retention.  Advised to clear medication with internist or PCP if any concurrent health problem with heart, blood pressure, or GI system exists. \par \par Has made progress, despite no formal PT, will attempt to return to work and monitor for worsening of symptoms

## 2023-06-26 ENCOUNTER — APPOINTMENT (OUTPATIENT)
Dept: ORTHOPEDIC SURGERY | Facility: CLINIC | Age: 48
End: 2023-06-26

## 2023-08-08 ENCOUNTER — APPOINTMENT (OUTPATIENT)
Dept: ORTHOPEDIC SURGERY | Facility: CLINIC | Age: 48
End: 2023-08-08
Payer: COMMERCIAL

## 2023-08-08 DIAGNOSIS — M51.26 OTHER INTERVERTEBRAL DISC DISPLACEMENT, LUMBAR REGION: ICD-10-CM

## 2023-08-08 PROCEDURE — 99214 OFFICE O/P EST MOD 30 MIN: CPT

## 2023-08-08 RX ORDER — NAPROXEN 500 MG/1
500 TABLET ORAL
Qty: 60 | Refills: 0 | Status: ACTIVE | COMMUNITY
Start: 2022-07-15 | End: 1900-01-01

## 2023-08-08 RX ORDER — GABAPENTIN 100 MG/1
100 CAPSULE ORAL
Qty: 60 | Refills: 1 | Status: ACTIVE | COMMUNITY
Start: 2022-06-07 | End: 1900-01-01

## 2023-08-08 NOTE — WORK
[Other: ___] : [unfilled] [Was the competent medical cause of the injury] : was the competent medical cause of the injury [Are consistent with the injury] : are consistent with the injury [Consistent with my objective findings] : consistent with my objective findings [Partial] : partial [Rx may affect patient's ability to return to work, make patient drowsy, or other issue] : Rx may affect patient's ability to return to work, make patient drowsy, or other issue. [I provided the services listed above] :  I provided the services listed above.

## 2023-08-08 NOTE — ASSESSMENT
[FreeTextEntry1] : 7/8/22- bilateral L5 laminectomy, right discectomy for BRIELLE Seems to have full b/b function at this time, c/w monitoring Initiate PT, this is a critical portion of her recovery Has developed intermittent radicular pain with recurrence of tension signs on exam This would reasonably be addressed with standard post-operative PT  continued mobility/trunk weakness, given she has not been permitted to enroll in formal therapy. Indicated for LSO for intermittent stabilization.   NSAIDs- Patient warned of risk of medication to GI tract, increased blood pressure, cardiac risk, and risk of fluid retention.  Advised to clear medication with internist or PCP if any concurrent health problem with heart, blood pressure, or GI system exists.   Gabapentin- Patient advised of sedating effects, instructed not to drive, operate machinery, or take with other sedating medications. Advised of need to taper on/off medication and risk of abruptly stopping gabapentin.  Has made progress, despite no formal PT, will attempt to return to work and monitor for worsening of symptoms  Patient seen by Queenie Godwin PA-C, with and under the supervision of  Dr. Tevin Tapia M.D.

## 2023-08-08 NOTE — HISTORY OF PRESENT ILLNESS
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 Convex left curvature. No vertebral body compression fracture. No spondylolysis. T12-L1: No herniation, foraminal stenosis, or central stenosis. Facet hypertrophy. L1-L2: No herniation, foraminal stenosis, or central stenosis. Facet hypertrophy and facet effusion. L2-L3: No herniation, foraminal stenosis, or central stenosis. Facet hypertrophy and facet effusion. L3-L4: No herniation, foraminal stenosis, or central stenosis. Facet hypertrophy and facet effusion. L4-L5: No herniation, foraminal stenosis, or central stenosis. Facet hypertrophy and facet effusion. L5-S1: Loss of disc signal. Bulge, facet hypertrophy, ligamentum flavum hypertrophy, and facet effusion with no foraminal stenosis. Broad central and asymmetric to right herniation impressing on the thecal sac and S1 nerve roots right greater than left with posterior displacement of the right S1 nerve root and moderate-to-severe right-sided canal stenosis. Ind. 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 Otoole notes 6/6/22- "Pt is a 46 year old female who presents for evaluation of her RT ankle. No trauma reported. Pt. was seen 5/24 and 5/31/2022 by [facility]: WILVER Gordon initially with c/o right leg pain since early May 2022. She is scheduled to see spine specialist, Dr. Tapia tomorrow. She experiences LBP and pain that radiates into her RT lower extremity. There is numbness/tingling in the RT leg. NSAIDS and muscle relaxer prn. Ambulating with a cane. No previous injury/problem with RT ankle."  6/7/22- Right-sided LBP radiating to  RLE (glute, lateral thigh/calf, toes 4-5, with N/T), especially with prolonged standing. Patient strained her back on 5/6/22 when trying to catch a patient who was falling. Alleviated with ibuprofen 800 mg and cyclobenzaprine. Denies prior lower back injuries/surgeries. No BB dysfunction. Has appointment for physical therapy 6/20/22.   6/17/22- MRI follow up. No b/b incontinence. Still severe RLE radicular pain to the toes.  7/7/22- Still LBP radiating down the RLE with N/T in the lateral toes and plantar foot. Taking flexeril, kashif, IBU with mild relief. Reports she did have an episode of loss of bladder control 5 nights ago, this is new for her. Was a full void, not a small amount of urine. Was unaware she needed to void. Did not seek medical attention at that time.  Denies saddle anesthesia.  7/15/22- Back ache, mild. No pain radiating down the RLE, though has some Numbness localized to the R heel and plantar foot. Voiding with control, feels she is adequately emptying 8/23/22- LBP much improved, with no radiation down RLE. Denies b/b incontinence.  11/1/22- Still low back ache. Intermittent N/T to the lateral R foot with ambulation, no sig. pain. Has not been approved for PT 12/13/22- Worsening LBP with N/T in lateral R foot with walking. Denies b/b dysfunction. PT has not been approved.  2/7/23- LBP slightly improving, althoug lingering soreness. PT still denied. Denies b/b dysfunction. Continued N/T in R lateral foot.  3/27/23- LBP slightly improved. PT denied.  Denies b/b dysfunction. N/T in R foot improved.  5/8/23- PT denies, still aching back with activity. Still some intermittent N/T in the R foot 8/8/23- Continued lower back pain with radiation to R calf and n/t in R foot. Aggravated by prolonged standing. PT not approved. Denies b/b dysfunction.  [FreeTextEntry5] : lloyd estevez,  [de-identified] : none

## 2023-08-08 NOTE — IMAGING
[de-identified] : LSPINE Palpation: b/l paraspinal TTP ROM: diminished in all planes Strength: 5/5 bilateral hip flexors, knee extensors, ankle dorsiflexors, EHL, Left ankle plantarflexors; 4/5 Right Gastroc-Soleus with good effort and able to perform single leg heel rise on right Sensation: SPLT L2-S1 b/l Provocative maneuvers: - R seated straight leg raise  no R calf tenderness  Bilateral hips- Palpation: No tenderness to palpation over greater trochanter or IT band on left; TTP R greater troch (mild) ROM: No pain with flexion and internal rotation

## 2024-01-25 NOTE — ED PROVIDER NOTE - SKIN, MLM
Reviewed that elevated BP would change timing of delivery. Will check BP daily, to call >140/90   Skin normal color for race, warm, dry and intact. No evidence of rash.

## 2024-03-12 ENCOUNTER — APPOINTMENT (OUTPATIENT)
Dept: ORTHOPEDIC SURGERY | Facility: CLINIC | Age: 49
End: 2024-03-12
Payer: COMMERCIAL

## 2024-03-12 VITALS — BODY MASS INDEX: 26.03 KG/M2 | WEIGHT: 162 LBS | HEIGHT: 66 IN

## 2024-03-12 DIAGNOSIS — M54.16 RADICULOPATHY, LUMBAR REGION: ICD-10-CM

## 2024-03-12 DIAGNOSIS — Z98.890 OTHER SPECIFIED POSTPROCEDURAL STATES: ICD-10-CM

## 2024-03-12 DIAGNOSIS — M62.830 MUSCLE SPASM OF BACK: ICD-10-CM

## 2024-03-12 PROCEDURE — 99214 OFFICE O/P EST MOD 30 MIN: CPT

## 2024-03-12 RX ORDER — CYCLOBENZAPRINE HYDROCHLORIDE 5 MG/1
5 TABLET, FILM COATED ORAL
Qty: 30 | Refills: 0 | Status: ACTIVE | COMMUNITY
Start: 2022-05-24 | End: 1900-01-01

## 2024-03-12 NOTE — IMAGING
[de-identified] : LSPINE Palpation: b/l paraspinal TTP ROM: diminished in all planes Strength: 5/5 bilateral hip flexors, knee extensors, ankle dorsiflexors, EHL, Left ankle plantarflexors; 4/5 Right Gastroc-Soleus with good effort and able to perform single leg heel rise on right Sensation: SPLT L2-S1 b/l Provocative maneuvers: - R seated straight leg raise  no R calf tenderness  Bilateral hips- Palpation: No tenderness to palpation over greater trochanter or IT band on left; TTP R greater troch (mild) ROM: No pain with flexion and internal rotation

## 2024-03-12 NOTE — ASSESSMENT
[FreeTextEntry1] : 7/8/22- bilateral L5 laminectomy, right discectomy for BRIELLE Seems to have full b/b function at this time, c/w monitoring Initiate PT, this is a critical portion of her recovery Has developed intermittent radicular pain with recurrence of tension signs on exam This would reasonably be addressed with standard post-operative PT  continued mobility/trunk weakness, given she has not been permitted to enroll in formal therapy. Indicated for LSO for intermittent stabilization.   NSAIDs- Patient warned of risk of medication to GI tract, increased blood pressure, cardiac risk, and risk of fluid retention.  Advised to clear medication with internist or PCP if any concurrent health problem with heart, blood pressure, or GI system exists.   Gabapentin- Patient advised of sedating effects, instructed not to drive, operate machinery, or take with other sedating medications. Advised of need to taper on/off medication and risk of abruptly stopping gabapentin.  Working full duty  Patient seen by Queenie Godwin PA-C,  under the supervision of  Dr. Tevin Tapia M.D.

## 2024-03-12 NOTE — HISTORY OF PRESENT ILLNESS
[Lower back] : lower back [8] : 8 [Sharp] : sharp [Rest] : rest [Meds] : meds [Walking] : walking [de-identified] : 7/8/22 L5 laminectomy, Right L5/S1 discectomy  05/06/2022 pt hurt the right leg trying to stop someone from falling. Occ: CNA  L spine MRI 6/15/22- Findings: Lordosis is maintained. Convex left curvature. No vertebral body compression fracture. No spondylolysis. T12-L1: No herniation, foraminal stenosis, or central stenosis. Facet hypertrophy. L1-L2: No herniation, foraminal stenosis, or central stenosis. Facet hypertrophy and facet effusion. L2-L3: No herniation, foraminal stenosis, or central stenosis. Facet hypertrophy and facet effusion. L3-L4: No herniation, foraminal stenosis, or central stenosis. Facet hypertrophy and facet effusion. L4-L5: No herniation, foraminal stenosis, or central stenosis. Facet hypertrophy and facet effusion. L5-S1: Loss of disc signal. Bulge, facet hypertrophy, ligamentum flavum hypertrophy, and facet effusion with no foraminal stenosis. Broad central and asymmetric to right herniation impressing on the thecal sac and S1 nerve roots right greater than left with posterior displacement of the right S1 nerve root and moderate-to-severe right-sided canal stenosis. Ind. 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 Otoole notes 6/6/22- "Pt is a 46 year old female who presents for evaluation of her RT ankle. No trauma reported. Pt. was seen 5/24 and 5/31/2022 by [facility]: WILVER Gordon initially with c/o right leg pain since early May 2022. She is scheduled to see spine specialist, Dr. Tapia tomorrow. She experiences LBP and pain that radiates into her RT lower extremity. There is numbness/tingling in the RT leg. NSAIDS and muscle relaxer prn. Ambulating with a cane. No previous injury/problem with RT ankle."  6/7/22- Right-sided LBP radiating to  RLE (glute, lateral thigh/calf, toes 4-5, with N/T), especially with prolonged standing. Patient strained her back on 5/6/22 when trying to catch a patient who was falling. Alleviated with ibuprofen 800 mg and cyclobenzaprine. Denies prior lower back injuries/surgeries. No BB dysfunction. Has appointment for physical therapy 6/20/22.   6/17/22- MRI follow up. No b/b incontinence. Still severe RLE radicular pain to the toes.  7/7/22- Still LBP radiating down the RLE with N/T in the lateral toes and plantar foot. Taking flexeril, kashif, IBU with mild relief. Reports she did have an episode of loss of bladder control 5 nights ago, this is new for her. Was a full void, not a small amount of urine. Was unaware she needed to void. Did not seek medical attention at that time.  Denies saddle anesthesia.  7/15/22- Back ache, mild. No pain radiating down the RLE, though has some Numbness localized to the R heel and plantar foot. Voiding with control, feels she is adequately emptying 8/23/22- LBP much improved, with no radiation down RLE. Denies b/b incontinence.  11/1/22- Still low back ache. Intermittent N/T to the lateral R foot with ambulation, no sig. pain. Has not been approved for PT 12/13/22- Worsening LBP with N/T in lateral R foot with walking. Denies b/b dysfunction. PT has not been approved.  2/7/23- LBP slightly improving, althoug lingering soreness. PT still denied. Denies b/b dysfunction. Continued N/T in R lateral foot.  3/27/23- LBP slightly improved. PT denied.  Denies b/b dysfunction. N/T in R foot improved.  5/8/23- PT denies, still aching back with activity. Still some intermittent N/T in the R foot 8/8/23- Continued lower back pain with radiation to R calf and n/t in R foot. Aggravated by prolonged standing. PT not approved. Denies b/b dysfunction.  3/12/24-Continued lower back pain with radiation down RLE to foot. No b/b dysfunction reported. Now being seen under 1199. [FreeTextEntry5] : lloyd estevez,  [de-identified] : none